# Patient Record
Sex: MALE | Race: WHITE | Employment: OTHER | URBAN - METROPOLITAN AREA
[De-identification: names, ages, dates, MRNs, and addresses within clinical notes are randomized per-mention and may not be internally consistent; named-entity substitution may affect disease eponyms.]

---

## 2018-01-05 ENCOUNTER — HOSPITAL ENCOUNTER (OUTPATIENT)
Dept: PHYSICAL THERAPY | Age: 68
Setting detail: THERAPIES SERIES
Discharge: HOME | End: 2018-01-05
Payer: COMMERCIAL

## 2018-01-05 PROCEDURE — 97110 THERAPEUTIC EXERCISES: CPT

## 2018-01-05 PROCEDURE — 97162 PT EVAL MOD COMPLEX 30 MIN: CPT

## 2018-01-05 PROCEDURE — G8978 MOBILITY CURRENT STATUS: HCPCS

## 2018-01-05 PROCEDURE — G8979 MOBILITY GOAL STATUS: HCPCS

## 2018-01-05 PROCEDURE — 97140 MANUAL THERAPY 1/> REGIONS: CPT
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
## 2018-01-08 ENCOUNTER — APPOINTMENT (OUTPATIENT)
Dept: PHYSICAL THERAPY | Age: 68
End: 2018-01-08
Payer: COMMERCIAL

## 2018-01-09 ENCOUNTER — HOSPITAL ENCOUNTER (OUTPATIENT)
Dept: PHYSICAL THERAPY | Age: 68
Setting detail: THERAPIES SERIES
Discharge: HOME | End: 2018-01-09
Payer: COMMERCIAL

## 2018-01-09 PROCEDURE — 97140 MANUAL THERAPY 1/> REGIONS: CPT

## 2018-01-09 PROCEDURE — G0283 ELEC STIM OTHER THAN WOUND: HCPCS

## 2018-01-09 PROCEDURE — 97110 THERAPEUTIC EXERCISES: CPT

## 2018-01-09 ASSESSMENT — PAIN SCALES - GENERAL: PAINLEVEL_OUTOF10: 4

## 2018-01-09 ASSESSMENT — PAIN DESCRIPTION - LOCATION: LOCATION: LEG;KNEE

## 2018-01-12 ENCOUNTER — HOSPITAL ENCOUNTER (OUTPATIENT)
Dept: PHYSICAL THERAPY | Age: 68
Setting detail: THERAPIES SERIES
Discharge: HOME | End: 2018-01-12
Payer: COMMERCIAL

## 2018-01-12 PROCEDURE — 97012 MECHANICAL TRACTION THERAPY: CPT

## 2018-01-12 PROCEDURE — 97530 THERAPEUTIC ACTIVITIES: CPT

## 2018-01-12 ASSESSMENT — PAIN DESCRIPTION - PAIN TYPE: TYPE: ACUTE PAIN

## 2018-01-12 ASSESSMENT — PAIN SCALES - GENERAL: PAINLEVEL_OUTOF10: 7

## 2018-01-12 ASSESSMENT — PAIN DESCRIPTION - LOCATION: LOCATION: LEG;KNEE

## 2018-01-12 NOTE — PROGRESS NOTES
increased R LE pain and was willing to try lumbar traction. Pt reports feeling \"better\" when he was in the traction unit but once he was up and walking his symptoms returned. Also discussed his home TENS unit and if pt not able to get any results with his unit we can use the one in our clinic. Pt reporting that he was able to feel the estim in our clinic better vs his home unit. 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 Also advised pt to use a st cane for the time being. REQUIRES PT FOLLOW UP: Yes      G-Code:     OutComes Score                                           Goals:  Short term goals  Time Frame for Short term goals: 1-2 weeks  Short term goal 1: Pt reports low back/R hip pain 3/10 or less with increased mobility  Long term goals  Time Frame for Long term goals : 4-5 weeks  Long term goal 1: Pt reports low back/Right hip pain 2/10 or less with increased mobility  Long term goal 2: Pt reports a  of NT down R LE by 75% or better  Long term goal 3: Increase R LE strength & core strength  to 4+/5 or greater so pt can do work duties painfree  Long term goal 4: Improve Oswestry to <20% or better  Long term goal 5:  Indep with HEP  Patient Goals   Patient goals : Be able to get rid of his back and hip pain    Plan:       Frequency and duration of tx  Days: 2 (2-3 - increased to 3x per week per pt request on 18)  Weeks:  (4-5)     Therapy Time   Individual Concurrent Group Co-treatment   Time In  11:00am         Time Out  12:00pm         Minutes  60 min                 Radha Ramos, LX#[id number]

## 2018-01-15 ENCOUNTER — HOSPITAL ENCOUNTER (OUTPATIENT)
Dept: PHYSICAL THERAPY | Age: 68
Setting detail: THERAPIES SERIES
Discharge: HOME | End: 2018-01-15
Payer: COMMERCIAL

## 2018-01-15 PROCEDURE — 97530 THERAPEUTIC ACTIVITIES: CPT

## 2018-01-15 PROCEDURE — 97012 MECHANICAL TRACTION THERAPY: CPT

## 2018-01-15 ASSESSMENT — PAIN SCALES - GENERAL: PAINLEVEL_OUTOF10: 4

## 2018-01-15 ASSESSMENT — PAIN DESCRIPTION - LOCATION: LOCATION: LEG;KNEE

## 2018-01-15 ASSESSMENT — PAIN DESCRIPTION - PAIN TYPE: TYPE: ACUTE PAIN

## 2018-01-15 NOTE — PROGRESS NOTES
Physical Therapy  Daily Treatment Note  Date: 1/15/2018  Patient Name: Nayeli Castanon  MRN: [id number]     :   1950    Subjective:   General  Chart Reviewed: Yes  Family / Caregiver Present: No  Referring Practitioner: Dr. Viv Zavala  PT Visit Information  Onset Date: 17  Total # of Visits Approved: 10 (2-3x per week for 4-5 weeks)  Total # of Visits to Date: 4  Plan of Care/Certification Expiration Date: 18  No Show: 0  Canceled Appointment: 0  Subjective  Subjective: Pt walking more upright this date using his cane and continues to have R LE pain but not as intense. Pt states he was laying in bed most of the day yesterday because he did not feel well. Pt reports getting a memory foam mattress. Pt cx'd appt initially secondary to not feeling well and then ended up feeling better so he thought he would come in and \"take a chance\". Pt states he is most painful when he is just standing. Pain Screening  Patient Currently in Pain: Yes  Pain Assessment  Pain Assessment: 0-10  Pain Level: 4  Pain Type: Acute pain  Pain Location: Leg;Knee  Vital Signs  Patient Currently in Pain: Yes       Treatment Activities:                  Ambulation  Ambulation?: Yes  Ambulation 1  Surface: level tile;carpet  Device: Single point cane  Assistance: Modified Independent  Quality of Gait: Pt ambulated with more upright trunk and less antalgia  Distance: In clinic - 50' x 2                         Modalities  Lumbar traction: Supine lumbar traction 65#on/10# off for 45 sec pull/15 sec release at 25 degree angle                               Assessment:   Conditions Requiring Skilled Therapeutic Intervention  Body structures, Functions, Activity limitations: Decreased functional mobility ; Decreased ADL status; Decreased strength;Decreased ROM  Assessment: Pt looking more upright this date when ambulating while using his cane and less painful. Performed lumbar traction for futher pain relief this date.  Attempted inc # this

## 2018-01-15 NOTE — PROGRESS NOTES
Physical Therapy  Daily Treatment Note  Date: 1/15/2018  Patient Name: Deven Rahman  MRN: [id number]     :   1950    Subjective:   General  Chart Reviewed: Yes  Family / Caregiver Present: No  Referring Practitioner: Dr. Petros Alexis  PT Visit Information  Onset Date: 17  Total # of Visits Approved: 10 (2-3x per week for 4-5 weeks)  Total # of Visits to Date: 3  Plan of Care/Certification Expiration Date: 18  No Show: 0  Canceled Appointment: 1  Subjective  Subjective: Pt cx'd appt. \"Not feeling well today\". Patient then arrived at 1100, feeling better and wishing to have his tx session back. Able to fit pt in for limited session to perform traction especially as giving patient relief. Assessment:   Conditions Requiring Skilled Therapeutic Intervention  Body structures, Functions, Activity limitations: Decreased functional mobility ; Decreased ADL status; Decreased strength;Decreased ROM  Treatment Diagnosis: Back pain with sciatica  REQUIRES PT FOLLOW UP: Yes      Goals:  Short term goals  Time Frame for Short term goals: 1-2 weeks  Short term goal 1: Pt reports low back/R hip pain 3/10 or less with increased mobility  Long term goals  Time Frame for Long term goals : 4-5 weeks  Long term goal 1: Pt reports low back/Right hip pain 2/10 or less with increased mobility  Long term goal 2: Pt reports a  of NT down R LE by 75% or better  Long term goal 3: Increase R LE strength & core strength  to 4+/5 or greater so pt can do work duties painfree  Long term goal 4: Improve Oswestry to <20% or better  Long term goal 5:  Indep with HEP  Patient Goals   Patient goals : Be able to get rid of his back and hip pain    Plan:      Plan   Plan  Times per week: 2x per week for 4-5 weeks=10 visits  Current Treatment Recommendations: Strengthening, ROM, Balance Training, Functional Mobility Training, Gait Training, Stair training, Neuromuscular Re-education, Manual Therapy - Soft Tissue Mobilization, Manual Therapy - Joint Manipulation, Pain Management, Home Exercise Program, Patient/Caregiver Education & Training  Plan Comment: Estim, US, HP/CP, KT tape, lumbar traction  Frequency and duration of tx  Days: 2 (2-3)  Weeks:  (4-5)     Therapy Time   Individual Concurrent Group Co-treatment   Time In           Time Out           Minutes  0          PT cancelled due to illness, then feeling better and came to therapy, able to give patient shortened session, will write second note for actual treatment.        Michael Bhandari PTA  License and [address] Number: [id number]

## 2018-01-17 ENCOUNTER — HOSPITAL ENCOUNTER (OUTPATIENT)
Dept: PHYSICAL THERAPY | Age: 68
Setting detail: THERAPIES SERIES
Discharge: HOME | End: 2018-01-17
Payer: COMMERCIAL

## 2018-01-17 PROCEDURE — 97140 MANUAL THERAPY 1/> REGIONS: CPT

## 2018-01-17 PROCEDURE — 97012 MECHANICAL TRACTION THERAPY: CPT

## 2018-01-17 PROCEDURE — 97110 THERAPEUTIC EXERCISES: CPT

## 2018-01-17 ASSESSMENT — PAIN DESCRIPTION - PAIN TYPE: TYPE: ACUTE PAIN

## 2018-01-17 ASSESSMENT — PAIN DESCRIPTION - DESCRIPTORS: DESCRIPTORS: ACHING;THROBBING

## 2018-01-17 ASSESSMENT — PAIN DESCRIPTION - ORIENTATION: ORIENTATION: RIGHT

## 2018-01-17 ASSESSMENT — PAIN SCALES - GENERAL: PAINLEVEL_OUTOF10: 4

## 2018-01-17 ASSESSMENT — PAIN DESCRIPTION - LOCATION: LOCATION: LEG;KNEE

## 2018-01-19 ENCOUNTER — HOSPITAL ENCOUNTER (OUTPATIENT)
Dept: PHYSICAL THERAPY | Age: 68
Setting detail: THERAPIES SERIES
Discharge: HOME | End: 2018-01-19
Payer: COMMERCIAL

## 2018-01-19 PROCEDURE — 97140 MANUAL THERAPY 1/> REGIONS: CPT

## 2018-01-19 PROCEDURE — 97110 THERAPEUTIC EXERCISES: CPT

## 2018-01-19 PROCEDURE — 97012 MECHANICAL TRACTION THERAPY: CPT

## 2018-01-19 NOTE — PROGRESS NOTES
Physical Therapy  Daily Treatment Note  Date: 2018  Patient Name: Nina Iisdro  MRN: [id number]     :   1950    Subjective:   General  Chart Reviewed: Yes  Family / Caregiver Present: No  Referring Practitioner: Dr. Uma Cleveland  PT Visit Information  Onset Date: 17  Total # of Visits Approved: 10 (2-3x per week for 4-5 weeks)  Total # of Visits to Date: 6  Plan of Care/Certification Expiration Date: 18  No Show: 0  Canceled Appointment: 0  Subjective  Subjective: Pt reports no R LE currently but having recurrent R LE 5/10 pain \"Im getting sick of this\" in regards to R LE pain  Pain Screening  Patient Currently in Pain: No  Vital Signs  Patient Currently in Pain: No       Treatment Activities:   Manual therapy  Joint mobilization: Long leg distraction & oscillations R LE x 5 reps hold 20 seconds - pt reports that it felt good for his right hip  PROM: PROM to R hip for hip flex and ER/IR        Exercises  Exercise 1: SKTC x 5 H20 -Causing more discomfort in R knee today. Exercise 3: butterfly stretch - attempted but increased pain in R LE     Modalities  Lumbar traction: Supine Lumbar traction 70#pull/0# rest; pull for 45 sec and rest for 15 rest at 15% angle  Manual therapy  Joint mobilization: Long leg distraction & oscillations R LE x 5 reps hold 20 seconds - pt reports that it felt good for his right hip  PROM: PROM to R hip for hip flex and ER/IR                          Assessment:   Conditions Requiring Skilled Therapeutic Intervention  Body structures, Functions, Activity limitations: Decreased functional mobility ; Decreased ADL status; Decreased strength;Decreased ROM  Assessment: Pt reports feeling good when he is receiving traction and when he first is done with traction but that it doesnt last. Pt reports also feeling like he has to have a bowel movement a lot ever since he took  Evermind for his nerve pain. Pt has stopped taking for about 4-5 days & still having this issue.  PT advised pt to call his doctor to see if he can get any relief. Pt reports not having long lasting effects with PT. Treatment Diagnosis: Back pain with sciatica  REQUIRES PT FOLLOW UP: Yes                Goals:  Short term goals  Time Frame for Short term goals: 1-2 weeks  Short term goal 1: Pt reports low back/R hip pain 3/10 or less with increased mobility  Long term goals  Time Frame for Long term goals : 4-5 weeks  Long term goal 1: Pt reports low back/Right hip pain 2/10 or less with increased mobility  Long term goal 2: Pt reports a  of NT down R LE by 75% or better  Long term goal 3: Increase R LE strength & core strength  to 4+/5 or greater so pt can do work duties painfree  Long term goal 4: Improve Oswestry to <20% or better  Long term goal 5:  Indep with HEP  Patient Goals   Patient goals : Be able to get rid of his back and hip pain    Plan:       Frequency and duration of tx  Days: 2 (2-3)  Weeks:  (4-5)     Therapy Time   Individual Concurrent Group Co-treatment   Time In  12:00pm         Time Out  1:00pm         Minutes  60 min                 SERA Ryder#[id number]

## 2018-01-22 ENCOUNTER — HOSPITAL ENCOUNTER (OUTPATIENT)
Dept: PHYSICAL THERAPY | Age: 68
Setting detail: THERAPIES SERIES
Discharge: HOME | End: 2018-01-22
Payer: COMMERCIAL

## 2018-01-22 PROCEDURE — 97012 MECHANICAL TRACTION THERAPY: CPT

## 2018-01-22 PROCEDURE — 97140 MANUAL THERAPY 1/> REGIONS: CPT

## 2018-01-22 PROCEDURE — 97530 THERAPEUTIC ACTIVITIES: CPT

## 2018-01-22 ASSESSMENT — PAIN DESCRIPTION - LOCATION: LOCATION: KNEE

## 2018-01-22 ASSESSMENT — PAIN DESCRIPTION - DESCRIPTORS: DESCRIPTORS: ACHING

## 2018-01-22 ASSESSMENT — PAIN DESCRIPTION - PAIN TYPE: TYPE: ACUTE PAIN

## 2018-01-22 ASSESSMENT — PAIN SCALES - GENERAL: PAINLEVEL_OUTOF10: 4

## 2018-01-22 ASSESSMENT — PAIN DESCRIPTION - ORIENTATION: ORIENTATION: RIGHT

## 2018-01-24 ENCOUNTER — HOSPITAL ENCOUNTER (OUTPATIENT)
Dept: PHYSICAL THERAPY | Age: 68
Setting detail: THERAPIES SERIES
Discharge: HOME | End: 2018-01-24
Payer: COMMERCIAL

## 2018-01-26 ENCOUNTER — HOSPITAL ENCOUNTER (OUTPATIENT)
Dept: PHYSICAL THERAPY | Age: 68
Setting detail: THERAPIES SERIES
Discharge: HOME | End: 2018-01-26
Payer: COMMERCIAL

## 2018-01-26 NOTE — PROGRESS NOTES
Physical Therapy  Daily Treatment Note  Date: 2018  Patient Name: Lavonna Shone  MRN: [id number]     :   1950    Subjective:   General  Chart Reviewed: Yes  Family / Caregiver Present: No  Referring Practitioner: Dr. Dennis Morrow  PT Visit Information  Onset Date: 17  Total # of Visits Approved: 10  Total # of Visits to Date: 7  Plan of Care/Certification Expiration Date: 18  No Show: 1  Canceled Appointment: 1  Subjective  Subjective: Pt failed to show for therapy. Treatment Activities:                                                                          Assessment:   Conditions Requiring Skilled Therapeutic Intervention  Body structures, Functions, Activity limitations: Decreased functional mobility ; Decreased ADL status; Decreased strength;Decreased ROM  Treatment Diagnosis: Back pain with sciatica  REQUIRES PT FOLLOW UP: Yes      G-Code:     OutComes Score                                           Goals:  Short term goals  Time Frame for Short term goals: 1-2 weeks  Short term goal 1: Pt reports low back/R hip pain 3/10 or less with increased mobility  Long term goals  Time Frame for Long term goals : 4-5 weeks  Long term goal 1: Pt reports low back/Right hip pain 2/10 or less with increased mobility  Long term goal 2: Pt reports a  of NT down R LE by 75% or better  Long term goal 3: Increase R LE strength & core strength  to 4+/5 or greater so pt can do work duties painfree  Long term goal 4: Improve Oswestry to <20% or better  Long term goal 5:  Indep with HEP  Patient Goals   Patient goals : Be able to get rid of his back and hip pain    Plan:       Frequency and duration of tx  Days: 2 (2-3)  Weeks: 4 (4-5)     Therapy Time   Individual Concurrent Group Co-treatment   Time In  1000         Time Out  1010         Minutes  5 min   No show                 Margaret Zamudio PTA  License and [address] Number: [id number]

## 2018-01-30 ENCOUNTER — HOSPITAL ENCOUNTER (OUTPATIENT)
Dept: MRI IMAGING | Age: 68
Discharge: HOME | End: 2018-02-01
Payer: COMMERCIAL

## 2018-01-30 DIAGNOSIS — M54.16 LUMBAR RADICULOPATHY: ICD-10-CM

## 2018-01-30 DIAGNOSIS — M47.816 LUMBAR SPONDYLOSIS: ICD-10-CM

## 2018-01-30 PROCEDURE — 72148 MRI LUMBAR SPINE W/O DYE: CPT

## 2018-02-06 PROBLEM — M48.061 SPINAL STENOSIS OF LUMBAR REGION WITHOUT NEUROGENIC CLAUDICATION: Status: ACTIVE | Noted: 2018-02-06

## 2018-02-06 PROBLEM — M51.26 HERNIATED LUMBAR INTERVERTEBRAL DISC: Status: ACTIVE | Noted: 2018-02-06

## 2018-02-06 PROBLEM — M54.16 LUMBAR RADICULOPATHY: Status: ACTIVE | Noted: 2018-02-06

## 2018-02-09 ENCOUNTER — HOSPITAL ENCOUNTER (OUTPATIENT)
Dept: PREADMISSION TESTING | Age: 68
Discharge: HOME | End: 2018-02-13
Payer: COMMERCIAL

## 2018-02-09 VITALS
WEIGHT: 195 LBS | SYSTOLIC BLOOD PRESSURE: 104 MMHG | DIASTOLIC BLOOD PRESSURE: 72 MMHG | TEMPERATURE: 99 F | OXYGEN SATURATION: 94 % | RESPIRATION RATE: 16 BRPM | HEIGHT: 71 IN | BODY MASS INDEX: 27.3 KG/M2 | HEART RATE: 73 BPM

## 2018-02-09 DIAGNOSIS — A04.72 C. DIFFICILE DIARRHEA: Chronic | ICD-10-CM

## 2018-02-09 DIAGNOSIS — N36.42 INTRINSIC URETHRAL SPHINCTER DEFICIENCY: ICD-10-CM

## 2018-02-09 PROBLEM — E78.5 HYPERLIPIDEMIA: Chronic | Status: ACTIVE | Noted: 2018-02-09

## 2018-02-09 PROBLEM — I10 HTN (HYPERTENSION): Chronic | Status: ACTIVE | Noted: 2018-02-09

## 2018-02-09 PROBLEM — G47.00 INSOMNIA: Chronic | Status: ACTIVE | Noted: 2018-02-09

## 2018-02-09 LAB
ANION GAP SERPL CALCULATED.3IONS-SCNC: 11 MEQ/L (ref 7–13)
BILIRUBIN URINE: NEGATIVE
BLOOD, URINE: NEGATIVE
BUN BLDV-MCNC: 21 MG/DL (ref 8–23)
CALCIUM SERPL-MCNC: 9.3 MG/DL (ref 8.6–10.2)
CHLORIDE BLD-SCNC: 102 MEQ/L (ref 98–107)
CLARITY: CLEAR
CO2: 26 MEQ/L (ref 22–29)
COLOR: YELLOW
CREAT SERPL-MCNC: 1.08 MG/DL (ref 0.7–1.2)
GFR AFRICAN AMERICAN: >60
GFR NON-AFRICAN AMERICAN: >60
GLUCOSE BLD-MCNC: 79 MG/DL (ref 74–109)
GLUCOSE URINE: NEGATIVE MG/DL
HCT VFR BLD CALC: 44.8 % (ref 42–52)
HEMOGLOBIN: 14.9 G/DL (ref 14–18)
INR BLD: 1
KETONES, URINE: NEGATIVE MG/DL
LEUKOCYTE ESTERASE, URINE: NEGATIVE
MCH RBC QN AUTO: 30.6 PG (ref 27–31.3)
MCHC RBC AUTO-ENTMCNC: 33.3 % (ref 33–37)
MCV RBC AUTO: 91.8 FL (ref 80–100)
NITRITE, URINE: NEGATIVE
PDW BLD-RTO: 14.7 % (ref 11.5–14.5)
PH UA: 5.5 (ref 5–9)
PLATELET # BLD: 175 K/UL (ref 130–400)
POTASSIUM SERPL-SCNC: 5.1 MEQ/L (ref 3.5–5.1)
PROTEIN UA: NEGATIVE MG/DL
PROTHROMBIN TIME: 10.7 SEC (ref 8.1–13.7)
RBC # BLD: 4.88 M/UL (ref 4.7–6.1)
SODIUM BLD-SCNC: 139 MEQ/L (ref 132–144)
SPECIFIC GRAVITY UA: 1.02 (ref 1–1.03)
URINE REFLEX TO CULTURE: NORMAL
UROBILINOGEN, URINE: 0.2 E.U./DL
WBC # BLD: 6.2 K/UL (ref 4.8–10.8)

## 2018-02-09 PROCEDURE — 80048 BASIC METABOLIC PNL TOTAL CA: CPT

## 2018-02-09 PROCEDURE — 85027 COMPLETE CBC AUTOMATED: CPT

## 2018-02-09 PROCEDURE — 86870 RBC ANTIBODY IDENTIFICATION: CPT

## 2018-02-09 PROCEDURE — 86850 RBC ANTIBODY SCREEN: CPT

## 2018-02-09 PROCEDURE — 86900 BLOOD TYPING SEROLOGIC ABO: CPT

## 2018-02-09 PROCEDURE — 85610 PROTHROMBIN TIME: CPT

## 2018-02-09 PROCEDURE — 86901 BLOOD TYPING SEROLOGIC RH(D): CPT

## 2018-02-09 PROCEDURE — 81003 URINALYSIS AUTO W/O SCOPE: CPT

## 2018-02-09 RX ORDER — ACETAMINOPHEN 325 MG/1
650 TABLET ORAL EVERY 6 HOURS PRN
COMMUNITY

## 2018-02-09 RX ORDER — SODIUM CHLORIDE 0.9 % (FLUSH) 0.9 %
10 SYRINGE (ML) INJECTION PRN
Status: CANCELLED | OUTPATIENT
Start: 2018-02-09

## 2018-02-09 RX ORDER — LIDOCAINE HYDROCHLORIDE 10 MG/ML
1 INJECTION, SOLUTION EPIDURAL; INFILTRATION; INTRACAUDAL; PERINEURAL
Status: CANCELLED | OUTPATIENT
Start: 2018-02-09 | End: 2018-02-09

## 2018-02-09 RX ORDER — SODIUM CHLORIDE, SODIUM LACTATE, POTASSIUM CHLORIDE, CALCIUM CHLORIDE 600; 310; 30; 20 MG/100ML; MG/100ML; MG/100ML; MG/100ML
INJECTION, SOLUTION INTRAVENOUS CONTINUOUS
Status: CANCELLED | OUTPATIENT
Start: 2018-02-09

## 2018-02-09 RX ORDER — SODIUM CHLORIDE, SODIUM LACTATE, POTASSIUM CHLORIDE, CALCIUM CHLORIDE 600; 310; 30; 20 MG/100ML; MG/100ML; MG/100ML; MG/100ML
INJECTION, SOLUTION INTRAVENOUS CONTINUOUS
Status: CANCELLED | OUTPATIENT
Start: 2018-02-14

## 2018-02-09 RX ORDER — SODIUM CHLORIDE 0.9 % (FLUSH) 0.9 %
10 SYRINGE (ML) INJECTION EVERY 12 HOURS SCHEDULED
Status: CANCELLED | OUTPATIENT
Start: 2018-02-09

## 2018-02-10 LAB
ABO/RH: NORMAL
ANTIBODY IDENTIFICATION: NORMAL
ANTIBODY SCREEN: NORMAL

## 2018-02-14 ENCOUNTER — HOSPITAL ENCOUNTER (OUTPATIENT)
Age: 68
Discharge: HOME | End: 2018-02-14
Attending: NEUROLOGICAL SURGERY | Admitting: NEUROLOGICAL SURGERY
Payer: COMMERCIAL

## 2018-02-14 ENCOUNTER — HOSPITAL ENCOUNTER (OUTPATIENT)
Dept: GENERAL RADIOLOGY | Age: 68
Setting detail: OUTPATIENT SURGERY
Discharge: HOME | End: 2018-02-16
Attending: NEUROLOGICAL SURGERY
Payer: COMMERCIAL

## 2018-02-14 ENCOUNTER — ANESTHESIA (OUTPATIENT)
Dept: OPERATING ROOM | Age: 68
End: 2018-02-14
Payer: COMMERCIAL

## 2018-02-14 ENCOUNTER — ANESTHESIA EVENT (OUTPATIENT)
Dept: OPERATING ROOM | Age: 68
End: 2018-02-14
Payer: COMMERCIAL

## 2018-02-14 VITALS — OXYGEN SATURATION: 100 % | TEMPERATURE: 95.4 F | DIASTOLIC BLOOD PRESSURE: 101 MMHG | SYSTOLIC BLOOD PRESSURE: 139 MMHG

## 2018-02-14 VITALS
RESPIRATION RATE: 20 BRPM | OXYGEN SATURATION: 95 % | DIASTOLIC BLOOD PRESSURE: 71 MMHG | HEART RATE: 104 BPM | WEIGHT: 195 LBS | SYSTOLIC BLOOD PRESSURE: 106 MMHG | HEIGHT: 71 IN | TEMPERATURE: 98.1 F | BODY MASS INDEX: 27.3 KG/M2

## 2018-02-14 DIAGNOSIS — M48.061 SPINAL STENOSIS OF LUMBAR REGION WITHOUT NEUROGENIC CLAUDICATION: ICD-10-CM

## 2018-02-14 DIAGNOSIS — M54.16 LUMBAR RADICULOPATHY: ICD-10-CM

## 2018-02-14 DIAGNOSIS — R52 PAIN: ICD-10-CM

## 2018-02-14 DIAGNOSIS — M51.26 HERNIATED LUMBAR INTERVERTEBRAL DISC: Primary | ICD-10-CM

## 2018-02-14 LAB
EKG ATRIAL RATE: 103 BPM
EKG P AXIS: 35 DEGREES
EKG P-R INTERVAL: 148 MS
EKG Q-T INTERVAL: 382 MS
EKG QRS DURATION: 126 MS
EKG QTC CALCULATION (BAZETT): 500 MS
EKG R AXIS: -60 DEGREES
EKG T AXIS: 94 DEGREES
EKG VENTRICULAR RATE: 103 BPM

## 2018-02-14 PROCEDURE — 3600000014 HC SURGERY LEVEL 4 ADDTL 15MIN: Performed by: NEUROLOGICAL SURGERY

## 2018-02-14 PROCEDURE — 2580000003 HC RX 258: Performed by: STUDENT IN AN ORGANIZED HEALTH CARE EDUCATION/TRAINING PROGRAM

## 2018-02-14 PROCEDURE — 6370000000 HC RX 637 (ALT 250 FOR IP): Performed by: NEUROLOGICAL SURGERY

## 2018-02-14 PROCEDURE — 2500000003 HC RX 250 WO HCPCS: Performed by: NURSE ANESTHETIST, CERTIFIED REGISTERED

## 2018-02-14 PROCEDURE — 3700000001 HC ADD 15 MINUTES (ANESTHESIA): Performed by: NEUROLOGICAL SURGERY

## 2018-02-14 PROCEDURE — 6360000002 HC RX W HCPCS: Performed by: NURSE ANESTHETIST, CERTIFIED REGISTERED

## 2018-02-14 PROCEDURE — 2720000010 HC SURG SUPPLY STERILE: Performed by: NEUROLOGICAL SURGERY

## 2018-02-14 PROCEDURE — 7100000001 HC PACU RECOVERY - ADDTL 15 MIN: Performed by: NEUROLOGICAL SURGERY

## 2018-02-14 PROCEDURE — 3600000004 HC SURGERY LEVEL 4 BASE: Performed by: NEUROLOGICAL SURGERY

## 2018-02-14 PROCEDURE — 2500000003 HC RX 250 WO HCPCS: Performed by: STUDENT IN AN ORGANIZED HEALTH CARE EDUCATION/TRAINING PROGRAM

## 2018-02-14 PROCEDURE — 3700000000 HC ANESTHESIA ATTENDED CARE: Performed by: NEUROLOGICAL SURGERY

## 2018-02-14 PROCEDURE — A6402 STERILE GAUZE <= 16 SQ IN: HCPCS | Performed by: NEUROLOGICAL SURGERY

## 2018-02-14 PROCEDURE — 6360000002 HC RX W HCPCS: Performed by: NURSE PRACTITIONER

## 2018-02-14 PROCEDURE — 72020 X-RAY EXAM OF SPINE 1 VIEW: CPT

## 2018-02-14 PROCEDURE — 2500000003 HC RX 250 WO HCPCS: Performed by: NEUROLOGICAL SURGERY

## 2018-02-14 PROCEDURE — 2580000003 HC RX 258: Performed by: NEUROLOGICAL SURGERY

## 2018-02-14 PROCEDURE — 93005 ELECTROCARDIOGRAM TRACING: CPT

## 2018-02-14 PROCEDURE — 7100000000 HC PACU RECOVERY - FIRST 15 MIN: Performed by: NEUROLOGICAL SURGERY

## 2018-02-14 PROCEDURE — 6360000002 HC RX W HCPCS: Performed by: NEUROLOGICAL SURGERY

## 2018-02-14 RX ORDER — ONDANSETRON 2 MG/ML
4 INJECTION INTRAMUSCULAR; INTRAVENOUS EVERY 6 HOURS PRN
Status: DISCONTINUED | OUTPATIENT
Start: 2018-02-14 | End: 2018-02-14 | Stop reason: HOSPADM

## 2018-02-14 RX ORDER — MORPHINE SULFATE 2 MG/ML
2 INJECTION, SOLUTION INTRAMUSCULAR; INTRAVENOUS
Status: DISCONTINUED | OUTPATIENT
Start: 2018-02-14 | End: 2018-02-14 | Stop reason: HOSPADM

## 2018-02-14 RX ORDER — ACETAMINOPHEN 325 MG/1
650 TABLET ORAL EVERY 4 HOURS PRN
Status: DISCONTINUED | OUTPATIENT
Start: 2018-02-14 | End: 2018-02-14 | Stop reason: HOSPADM

## 2018-02-14 RX ORDER — RAMIPRIL 5 MG/1
10 CAPSULE ORAL DAILY
Status: DISCONTINUED | OUTPATIENT
Start: 2018-02-14 | End: 2018-02-14 | Stop reason: HOSPADM

## 2018-02-14 RX ORDER — CYCLOBENZAPRINE HCL 10 MG
10 TABLET ORAL 3 TIMES DAILY PRN
Status: DISCONTINUED | OUTPATIENT
Start: 2018-02-14 | End: 2018-02-14 | Stop reason: HOSPADM

## 2018-02-14 RX ORDER — DEXAMETHASONE SODIUM PHOSPHATE 4 MG/ML
INJECTION, SOLUTION INTRA-ARTICULAR; INTRALESIONAL; INTRAMUSCULAR; INTRAVENOUS; SOFT TISSUE PRN
Status: DISCONTINUED | OUTPATIENT
Start: 2018-02-14 | End: 2018-02-14 | Stop reason: SDUPTHER

## 2018-02-14 RX ORDER — FENTANYL 25 UG/H
1 PATCH TRANSDERMAL
Status: DISCONTINUED | OUTPATIENT
Start: 2018-02-14 | End: 2018-02-14 | Stop reason: HOSPADM

## 2018-02-14 RX ORDER — SODIUM CHLORIDE 0.9 % (FLUSH) 0.9 %
10 SYRINGE (ML) INJECTION PRN
Status: DISCONTINUED | OUTPATIENT
Start: 2018-02-14 | End: 2018-02-14 | Stop reason: HOSPADM

## 2018-02-14 RX ORDER — GLYCOPYRROLATE 0.2 MG/ML
INJECTION INTRAMUSCULAR; INTRAVENOUS PRN
Status: DISCONTINUED | OUTPATIENT
Start: 2018-02-14 | End: 2018-02-14 | Stop reason: SDUPTHER

## 2018-02-14 RX ORDER — HYDROCODONE BITARTRATE AND ACETAMINOPHEN 5; 325 MG/1; MG/1
2 TABLET ORAL PRN
Status: DISCONTINUED | OUTPATIENT
Start: 2018-02-14 | End: 2018-02-14 | Stop reason: HOSPADM

## 2018-02-14 RX ORDER — ASPIRIN 81 MG/1
81 TABLET ORAL DAILY
Status: DISCONTINUED | OUTPATIENT
Start: 2018-02-14 | End: 2018-02-14 | Stop reason: HOSPADM

## 2018-02-14 RX ORDER — HYDROCODONE BITARTRATE AND ACETAMINOPHEN 5; 325 MG/1; MG/1
1 TABLET ORAL PRN
Status: DISCONTINUED | OUTPATIENT
Start: 2018-02-14 | End: 2018-02-14 | Stop reason: HOSPADM

## 2018-02-14 RX ORDER — EPHEDRINE SULFATE 50 MG/ML
INJECTION, SOLUTION INTRAVENOUS PRN
Status: DISCONTINUED | OUTPATIENT
Start: 2018-02-14 | End: 2018-02-14 | Stop reason: SDUPTHER

## 2018-02-14 RX ORDER — DIPHENHYDRAMINE HYDROCHLORIDE 50 MG/ML
12.5 INJECTION INTRAMUSCULAR; INTRAVENOUS
Status: DISCONTINUED | OUTPATIENT
Start: 2018-02-14 | End: 2018-02-14 | Stop reason: HOSPADM

## 2018-02-14 RX ORDER — SODIUM CHLORIDE, SODIUM LACTATE, POTASSIUM CHLORIDE, CALCIUM CHLORIDE 600; 310; 30; 20 MG/100ML; MG/100ML; MG/100ML; MG/100ML
INJECTION, SOLUTION INTRAVENOUS CONTINUOUS
Status: DISCONTINUED | OUTPATIENT
Start: 2018-02-14 | End: 2018-02-14

## 2018-02-14 RX ORDER — FENTANYL CITRATE 50 UG/ML
50 INJECTION, SOLUTION INTRAMUSCULAR; INTRAVENOUS EVERY 10 MIN PRN
Status: DISCONTINUED | OUTPATIENT
Start: 2018-02-14 | End: 2018-02-14 | Stop reason: HOSPADM

## 2018-02-14 RX ORDER — SIMVASTATIN 5 MG
5 TABLET ORAL NIGHTLY
Status: CANCELLED | OUTPATIENT
Start: 2018-02-14

## 2018-02-14 RX ORDER — MAGNESIUM HYDROXIDE 1200 MG/15ML
LIQUID ORAL CONTINUOUS PRN
Status: DISCONTINUED | OUTPATIENT
Start: 2018-02-14 | End: 2018-02-14 | Stop reason: HOSPADM

## 2018-02-14 RX ORDER — CLONAZEPAM 2 MG/1
2 TABLET ORAL 2 TIMES DAILY
Status: DISCONTINUED | OUTPATIENT
Start: 2018-02-14 | End: 2018-02-14 | Stop reason: HOSPADM

## 2018-02-14 RX ORDER — HYDROCODONE BITARTRATE AND ACETAMINOPHEN 5; 325 MG/1; MG/1
1 TABLET ORAL EVERY 6 HOURS PRN
Qty: 28 TABLET | Refills: 0 | Status: SHIPPED | OUTPATIENT
Start: 2018-02-14 | End: 2018-02-21

## 2018-02-14 RX ORDER — FENTANYL CITRATE 50 UG/ML
INJECTION, SOLUTION INTRAMUSCULAR; INTRAVENOUS PRN
Status: DISCONTINUED | OUTPATIENT
Start: 2018-02-14 | End: 2018-02-14 | Stop reason: SDUPTHER

## 2018-02-14 RX ORDER — LIDOCAINE HYDROCHLORIDE 10 MG/ML
1 INJECTION, SOLUTION EPIDURAL; INFILTRATION; INTRACAUDAL; PERINEURAL
Status: COMPLETED | OUTPATIENT
Start: 2018-02-14 | End: 2018-02-14

## 2018-02-14 RX ORDER — MIDAZOLAM HYDROCHLORIDE 1 MG/ML
INJECTION INTRAMUSCULAR; INTRAVENOUS PRN
Status: DISCONTINUED | OUTPATIENT
Start: 2018-02-14 | End: 2018-02-14 | Stop reason: SDUPTHER

## 2018-02-14 RX ORDER — GABAPENTIN 300 MG/1
600 CAPSULE ORAL 3 TIMES DAILY PRN
Status: DISCONTINUED | OUTPATIENT
Start: 2018-02-14 | End: 2018-02-14 | Stop reason: HOSPADM

## 2018-02-14 RX ORDER — MEPERIDINE HYDROCHLORIDE 25 MG/ML
12.5 INJECTION INTRAMUSCULAR; INTRAVENOUS; SUBCUTANEOUS EVERY 5 MIN PRN
Status: DISCONTINUED | OUTPATIENT
Start: 2018-02-14 | End: 2018-02-14 | Stop reason: HOSPADM

## 2018-02-14 RX ORDER — SODIUM CHLORIDE 450 MG/100ML
INJECTION, SOLUTION INTRAVENOUS CONTINUOUS
Status: DISCONTINUED | OUTPATIENT
Start: 2018-02-14 | End: 2018-02-14 | Stop reason: HOSPADM

## 2018-02-14 RX ORDER — ACETAMINOPHEN 325 MG/1
650 TABLET ORAL EVERY 6 HOURS PRN
Status: DISCONTINUED | OUTPATIENT
Start: 2018-02-14 | End: 2018-02-14 | Stop reason: HOSPADM

## 2018-02-14 RX ORDER — HYDROCODONE BITARTRATE AND ACETAMINOPHEN 5; 325 MG/1; MG/1
1 TABLET ORAL EVERY 4 HOURS PRN
Status: DISCONTINUED | OUTPATIENT
Start: 2018-02-14 | End: 2018-02-14 | Stop reason: HOSPADM

## 2018-02-14 RX ORDER — ESMOLOL HYDROCHLORIDE 10 MG/ML
INJECTION INTRAVENOUS PRN
Status: DISCONTINUED | OUTPATIENT
Start: 2018-02-14 | End: 2018-02-14 | Stop reason: SDUPTHER

## 2018-02-14 RX ORDER — MAGNESIUM HYDROXIDE/ALUMINUM HYDROXICE/SIMETHICONE 120; 1200; 1200 MG/30ML; MG/30ML; MG/30ML
30 SUSPENSION ORAL EVERY 6 HOURS PRN
Status: DISCONTINUED | OUTPATIENT
Start: 2018-02-14 | End: 2018-02-14 | Stop reason: HOSPADM

## 2018-02-14 RX ORDER — HYDROCODONE BITARTRATE AND ACETAMINOPHEN 5; 325 MG/1; MG/1
2 TABLET ORAL EVERY 4 HOURS PRN
Status: DISCONTINUED | OUTPATIENT
Start: 2018-02-14 | End: 2018-02-14 | Stop reason: HOSPADM

## 2018-02-14 RX ORDER — DOCUSATE SODIUM 100 MG/1
100 CAPSULE, LIQUID FILLED ORAL 2 TIMES DAILY
Status: DISCONTINUED | OUTPATIENT
Start: 2018-02-14 | End: 2018-02-14 | Stop reason: HOSPADM

## 2018-02-14 RX ORDER — ONDANSETRON 2 MG/ML
INJECTION INTRAMUSCULAR; INTRAVENOUS PRN
Status: DISCONTINUED | OUTPATIENT
Start: 2018-02-14 | End: 2018-02-14 | Stop reason: SDUPTHER

## 2018-02-14 RX ORDER — ROCURONIUM BROMIDE 10 MG/ML
INJECTION, SOLUTION INTRAVENOUS PRN
Status: DISCONTINUED | OUTPATIENT
Start: 2018-02-14 | End: 2018-02-14 | Stop reason: SDUPTHER

## 2018-02-14 RX ORDER — LIDOCAINE HYDROCHLORIDE 20 MG/ML
INJECTION, SOLUTION INFILTRATION; PERINEURAL PRN
Status: DISCONTINUED | OUTPATIENT
Start: 2018-02-14 | End: 2018-02-14 | Stop reason: SDUPTHER

## 2018-02-14 RX ORDER — ACETAMINOPHEN 650 MG/1
650 SUPPOSITORY RECTAL EVERY 4 HOURS PRN
Status: DISCONTINUED | OUTPATIENT
Start: 2018-02-14 | End: 2018-02-14 | Stop reason: DRUGHIGH

## 2018-02-14 RX ORDER — SODIUM CHLORIDE 0.9 % (FLUSH) 0.9 %
10 SYRINGE (ML) INJECTION EVERY 12 HOURS SCHEDULED
Status: DISCONTINUED | OUTPATIENT
Start: 2018-02-14 | End: 2018-02-14 | Stop reason: HOSPADM

## 2018-02-14 RX ORDER — HEPARIN SODIUM 5000 [USP'U]/ML
5000 INJECTION, SOLUTION INTRAVENOUS; SUBCUTANEOUS EVERY 8 HOURS SCHEDULED
Status: DISCONTINUED | OUTPATIENT
Start: 2018-02-14 | End: 2018-02-14 | Stop reason: HOSPADM

## 2018-02-14 RX ORDER — METOCLOPRAMIDE HYDROCHLORIDE 5 MG/ML
10 INJECTION INTRAMUSCULAR; INTRAVENOUS
Status: DISCONTINUED | OUTPATIENT
Start: 2018-02-14 | End: 2018-02-14 | Stop reason: HOSPADM

## 2018-02-14 RX ORDER — KETOROLAC TROMETHAMINE 30 MG/ML
30 INJECTION, SOLUTION INTRAMUSCULAR; INTRAVENOUS EVERY 6 HOURS
Status: DISCONTINUED | OUTPATIENT
Start: 2018-02-14 | End: 2018-02-14 | Stop reason: HOSPADM

## 2018-02-14 RX ORDER — PROPOFOL 10 MG/ML
INJECTION, EMULSION INTRAVENOUS PRN
Status: DISCONTINUED | OUTPATIENT
Start: 2018-02-14 | End: 2018-02-14 | Stop reason: SDUPTHER

## 2018-02-14 RX ORDER — NIACIN 500 MG/1
1000 TABLET, EXTENDED RELEASE ORAL NIGHTLY
Status: DISCONTINUED | OUTPATIENT
Start: 2018-02-14 | End: 2018-02-14 | Stop reason: HOSPADM

## 2018-02-14 RX ORDER — CARVEDILOL 3.12 MG/1
3.12 TABLET ORAL 2 TIMES DAILY
Status: DISCONTINUED | OUTPATIENT
Start: 2018-02-14 | End: 2018-02-14 | Stop reason: HOSPADM

## 2018-02-14 RX ORDER — LIDOCAINE HYDROCHLORIDE AND EPINEPHRINE 10; 10 MG/ML; UG/ML
INJECTION, SOLUTION INFILTRATION; PERINEURAL PRN
Status: DISCONTINUED | OUTPATIENT
Start: 2018-02-14 | End: 2018-02-14 | Stop reason: HOSPADM

## 2018-02-14 RX ORDER — ONDANSETRON 2 MG/ML
4 INJECTION INTRAMUSCULAR; INTRAVENOUS
Status: DISCONTINUED | OUTPATIENT
Start: 2018-02-14 | End: 2018-02-14 | Stop reason: HOSPADM

## 2018-02-14 RX ADMIN — SODIUM CHLORIDE, POTASSIUM CHLORIDE, SODIUM LACTATE AND CALCIUM CHLORIDE: 600; 310; 30; 20 INJECTION, SOLUTION INTRAVENOUS at 09:32

## 2018-02-14 RX ADMIN — LIDOCAINE HYDROCHLORIDE 0.1 ML: 10 INJECTION, SOLUTION EPIDURAL; INFILTRATION; INTRACAUDAL; PERINEURAL at 09:30

## 2018-02-14 RX ADMIN — ROCURONIUM BROMIDE 10 MG: 10 INJECTION INTRAVENOUS at 11:06

## 2018-02-14 RX ADMIN — DEXAMETHASONE SODIUM PHOSPHATE 8 MG: 4 INJECTION INTRA-ARTICULAR; INTRALESIONAL; INTRAMUSCULAR; INTRAVENOUS; SOFT TISSUE at 10:39

## 2018-02-14 RX ADMIN — SUGAMMADEX 200 MG: 100 INJECTION, SOLUTION INTRAVENOUS at 12:31

## 2018-02-14 RX ADMIN — EPHEDRINE SULFATE 5 MG: 50 INJECTION, SOLUTION INTRAMUSCULAR; INTRAVENOUS; SUBCUTANEOUS at 11:40

## 2018-02-14 RX ADMIN — EPHEDRINE SULFATE 5 MG: 50 INJECTION, SOLUTION INTRAMUSCULAR; INTRAVENOUS; SUBCUTANEOUS at 11:50

## 2018-02-14 RX ADMIN — EPHEDRINE SULFATE 5 MG: 50 INJECTION, SOLUTION INTRAMUSCULAR; INTRAVENOUS; SUBCUTANEOUS at 11:21

## 2018-02-14 RX ADMIN — ONDANSETRON 4 MG: 2 INJECTION INTRAMUSCULAR; INTRAVENOUS at 11:53

## 2018-02-14 RX ADMIN — EPHEDRINE SULFATE 5 MG: 50 INJECTION, SOLUTION INTRAMUSCULAR; INTRAVENOUS; SUBCUTANEOUS at 12:10

## 2018-02-14 RX ADMIN — GLYCOPYRROLATE 0.4 MG: 0.2 INJECTION INTRAMUSCULAR; INTRAVENOUS at 12:09

## 2018-02-14 RX ADMIN — Medication 0.1 MG: at 11:00

## 2018-02-14 RX ADMIN — PROPOFOL 20 MG: 10 INJECTION, EMULSION INTRAVENOUS at 12:15

## 2018-02-14 RX ADMIN — ALUMINUM HYDROXIDE, MAGNESIUM HYDROXIDE, AND SIMETHICONE 30 ML: 200; 200; 20 SUSPENSION ORAL at 13:56

## 2018-02-14 RX ADMIN — EPHEDRINE SULFATE 10 MG: 50 INJECTION, SOLUTION INTRAMUSCULAR; INTRAVENOUS; SUBCUTANEOUS at 12:12

## 2018-02-14 RX ADMIN — ESMOLOL HYDROCHLORIDE 10 MG: 100 INJECTION, SOLUTION INTRAVENOUS at 12:21

## 2018-02-14 RX ADMIN — PROPOFOL 200 MG: 10 INJECTION, EMULSION INTRAVENOUS at 10:26

## 2018-02-14 RX ADMIN — SODIUM CHLORIDE, POTASSIUM CHLORIDE, SODIUM LACTATE AND CALCIUM CHLORIDE: 600; 310; 30; 20 INJECTION, SOLUTION INTRAVENOUS at 11:56

## 2018-02-14 RX ADMIN — ESMOLOL HYDROCHLORIDE 10 MG: 100 INJECTION, SOLUTION INTRAVENOUS at 12:23

## 2018-02-14 RX ADMIN — PROPOFOL 20 MG: 10 INJECTION, EMULSION INTRAVENOUS at 12:17

## 2018-02-14 RX ADMIN — ROCURONIUM BROMIDE 20 MG: 10 INJECTION INTRAVENOUS at 11:28

## 2018-02-14 RX ADMIN — EPHEDRINE SULFATE 5 MG: 50 INJECTION, SOLUTION INTRAMUSCULAR; INTRAVENOUS; SUBCUTANEOUS at 11:09

## 2018-02-14 RX ADMIN — Medication 0.1 MG: at 12:00

## 2018-02-14 RX ADMIN — ROCURONIUM BROMIDE 50 MG: 10 INJECTION INTRAVENOUS at 10:27

## 2018-02-14 RX ADMIN — ESMOLOL HYDROCHLORIDE 10 MG: 100 INJECTION, SOLUTION INTRAVENOUS at 12:19

## 2018-02-14 RX ADMIN — MIDAZOLAM HYDROCHLORIDE 2 MG: 1 INJECTION, SOLUTION INTRAMUSCULAR; INTRAVENOUS at 10:20

## 2018-02-14 RX ADMIN — LIDOCAINE HYDROCHLORIDE 100 MG: 20 INJECTION, SOLUTION INFILTRATION; PERINEURAL at 10:26

## 2018-02-14 RX ADMIN — Medication 0.1 MG: at 11:02

## 2018-02-14 RX ADMIN — ROCURONIUM BROMIDE 20 MG: 10 INJECTION INTRAVENOUS at 11:52

## 2018-02-14 RX ADMIN — PROPOFOL 40 MG: 10 INJECTION, EMULSION INTRAVENOUS at 12:13

## 2018-02-14 RX ADMIN — CEFAZOLIN SODIUM 2 G: 2 SOLUTION INTRAVENOUS at 10:20

## 2018-02-14 RX ADMIN — FENTANYL CITRATE 100 MCG: 50 INJECTION, SOLUTION INTRAMUSCULAR; INTRAVENOUS at 10:26

## 2018-02-14 RX ADMIN — KETOROLAC TROMETHAMINE 30 MG: 30 INJECTION, SOLUTION INTRAMUSCULAR at 15:51

## 2018-02-14 RX ADMIN — ESMOLOL HYDROCHLORIDE 20 MG: 100 INJECTION, SOLUTION INTRAVENOUS at 12:18

## 2018-02-14 RX ADMIN — HEPARIN SODIUM 5000 UNITS: 5000 INJECTION, SOLUTION INTRAVENOUS; SUBCUTANEOUS at 15:51

## 2018-02-14 ASSESSMENT — PULMONARY FUNCTION TESTS
PIF_VALUE: 20
PIF_VALUE: 19
PIF_VALUE: 17
PIF_VALUE: 21
PIF_VALUE: 19
PIF_VALUE: 19
PIF_VALUE: 21
PIF_VALUE: 2
PIF_VALUE: 20
PIF_VALUE: 17
PIF_VALUE: 17
PIF_VALUE: 18
PIF_VALUE: 17
PIF_VALUE: 21
PIF_VALUE: 13
PIF_VALUE: 20
PIF_VALUE: 19
PIF_VALUE: 18
PIF_VALUE: 16
PIF_VALUE: 20
PIF_VALUE: 17
PIF_VALUE: 18
PIF_VALUE: 20
PIF_VALUE: 17
PIF_VALUE: 20
PIF_VALUE: 20
PIF_VALUE: 19
PIF_VALUE: 17
PIF_VALUE: 20
PIF_VALUE: 19
PIF_VALUE: 20
PIF_VALUE: 17
PIF_VALUE: 20
PIF_VALUE: 8
PIF_VALUE: 2
PIF_VALUE: 20
PIF_VALUE: 20
PIF_VALUE: 18
PIF_VALUE: 20
PIF_VALUE: 17
PIF_VALUE: 16
PIF_VALUE: 17
PIF_VALUE: 19
PIF_VALUE: 17
PIF_VALUE: 3
PIF_VALUE: 17
PIF_VALUE: 12
PIF_VALUE: 17
PIF_VALUE: 21
PIF_VALUE: 17
PIF_VALUE: 17
PIF_VALUE: 18
PIF_VALUE: 20
PIF_VALUE: 4
PIF_VALUE: 21
PIF_VALUE: 18
PIF_VALUE: 20
PIF_VALUE: 19
PIF_VALUE: 21
PIF_VALUE: 23
PIF_VALUE: 17
PIF_VALUE: 21
PIF_VALUE: 17
PIF_VALUE: 21
PIF_VALUE: 17
PIF_VALUE: 21
PIF_VALUE: 17
PIF_VALUE: 20
PIF_VALUE: 2
PIF_VALUE: 5
PIF_VALUE: 17
PIF_VALUE: 21
PIF_VALUE: 18
PIF_VALUE: 19
PIF_VALUE: 20
PIF_VALUE: 19
PIF_VALUE: 20
PIF_VALUE: 16
PIF_VALUE: 15
PIF_VALUE: 17
PIF_VALUE: 17
PIF_VALUE: 21
PIF_VALUE: 17
PIF_VALUE: 20
PIF_VALUE: 17
PIF_VALUE: 20
PIF_VALUE: 21
PIF_VALUE: 17
PIF_VALUE: 10
PIF_VALUE: 20
PIF_VALUE: 21
PIF_VALUE: 19
PIF_VALUE: 17
PIF_VALUE: 17
PIF_VALUE: 3
PIF_VALUE: 17
PIF_VALUE: 16
PIF_VALUE: 18
PIF_VALUE: 21
PIF_VALUE: 17
PIF_VALUE: 20
PIF_VALUE: 17
PIF_VALUE: 23
PIF_VALUE: 19
PIF_VALUE: 18
PIF_VALUE: 20
PIF_VALUE: 17
PIF_VALUE: 21
PIF_VALUE: 20
PIF_VALUE: 6
PIF_VALUE: 19
PIF_VALUE: 17
PIF_VALUE: 20
PIF_VALUE: 21
PIF_VALUE: 17
PIF_VALUE: 22
PIF_VALUE: 17
PIF_VALUE: 19
PIF_VALUE: 20
PIF_VALUE: 20

## 2018-02-14 ASSESSMENT — PAIN - FUNCTIONAL ASSESSMENT: PAIN_FUNCTIONAL_ASSESSMENT: 0-10

## 2018-02-14 ASSESSMENT — PAIN SCALES - GENERAL: PAINLEVEL_OUTOF10: 1

## 2018-02-14 NOTE — CONSULTS
week & had a seizure    Hyperlipidemia     meds since 1996    MI (myocardial infarction) 1996     CABG x 4 with aortic patch / 2010 2 cardiac stents       Past Surgical History:  Past Surgical History:   Procedure Laterality Date    ABDOMEN SURGERY      large wound revision with mesh / due to non healing wound    CARDIAC SURGERY      CABG x 4 / aortic patch / 2 cardiac stents    COLONOSCOPY      HERNIA REPAIR      bilateral inguinal & umbilical hernia repairs    OTHER SURGICAL HISTORY      due to radical prostatectomy /artificial bladder sphincter    PATELLA FRACTURE SURGERY Right 1979    PROSTATE SURGERY  2000    radical prostatectomy due to cancer / no trx to follow    TRACHEOTOMY  2011    placement with then closure       Family History:       Problem Relation Age of Onset    Diabetes Mother     Heart Disease Father     High Cholesterol Sister     High Cholesterol Brother     Heart Disease Brother        Social  History:     Social History   Substance Use Topics    Smoking status: Never Smoker    Smokeless tobacco: Never Used    Alcohol use No       Home Medications:    Prior to Admission medications    Medication Sig Start Date End Date Taking?  Authorizing Provider   Coenzyme Q10 (COQ-10) 100 MG CAPS Take by mouth daily   Yes Historical Provider, MD   Evolocumab (REPATHA) 140 MG/ML SOSY Inject into the skin every 14 days   Yes Historical Provider, MD   acetaminophen (TYLENOL) 325 MG tablet Take 650 mg by mouth every 6 hours as needed for Pain   Yes Historical Provider, MD   aspirin 81 MG tablet Take 81 mg by mouth   Yes Historical Provider, MD   carvedilol (COREG) 3.125 MG tablet Take 3.125 mg by mouth   Yes Historical Provider, MD   clonazePAM (KLONOPIN) 2 MG tablet TAKE 1 TABLET BY MOUTH TWICE A DAY 11/25/17  Yes Historical Provider, MD   Multiple Vitamin (MULTI-VITAMINS) TABS TAKE 1 TABLET BY MOUTH EVERY DAY 1/3/18  Yes Historical Provider, MD   lovastatin (MEVACOR) 40 MG tablet Take 80 mg by mouth   Yes Historical Provider, MD   niacin (NIASPAN) 1000 MG extended release tablet Take 1,000 mg by mouth   Yes Historical Provider, MD   ramipril (ALTACE) 10 MG capsule Take 10 mg by mouth 7/29/16  Yes Historical Provider, MD   temazepam (RESTORIL) 30 MG capsule TAKE ONE CAPSULE BY MOUTH AT BEDTIME AS NEEDED 11/25/17  Yes Historical Provider, MD   gabapentin (NEURONTIN) 600 MG tablet Take 1 tablet by mouth 3 times daily as needed (nerve pain) for up to 30 days. 2/2/18 3/4/18  Francesca Case DO   gabapentin (NEURONTIN) 300 MG capsule Take 1 capsule by mouth nightly as needed (nerve pain) for up to 30 days. 1/9/18 2/8/18  Francesca Case DO       Current Medications:        ROS:   12 point review of systems was obtained in detail and is negative other than that noted above or below. Vital Signs:  Vitals:    02/09/18 1305   BP: 104/72   Pulse: 73   Resp: 16   Temp: 99 °F (37.2 °C)   TempSrc: Temporal   SpO2: 94%   Weight: 195 lb (88.5 kg)   Height: 5' 10.5\" (1.791 m)       Physical Examination:  GENERAL APPEARANCE: Well developed, well nourished, in no acute distress. CHEST: Symmetric and non-tender. INTEGUMENT: Skin warm and dry, without gross excoriationis or lesions. HEENT: No gross abnormalities of conjunctiva, teeth, gums, oral mucosa  NECK: Supple, no JVD, no bruit. Thyroid not palpable. Carotid upstrokes normal.  NEURO/PSHCY: Alert and oriented x3; appropriate behavior and responses and responses, grossly normal cerebellar function with normal balance and coordination  LUNGS: Clear to auscultation bilaterally; normal respiratory effort. HEART: Rate and rhythm regular with no evident murmur; no gallop appreciated. There are no rubs, clicks or heaves. PMI nondisplaced. ABDOMEN: Soft, nontender, no palpable hepatosplenomegaly, no mases, no bruits. Abdominal aorta not noted to be enlarged. MUSCULOSKELETAL: Ambulatory with normal tandem gait.   EXTREMITIES: Warm with good color, no clubbing or

## 2018-02-14 NOTE — H&P
Initial consult     2/6/2018  Morena Dural. Mike Arnett MD   Neurosurgery   Herniated lumbar intervertebral disc +2 more   Dx   Lower Back Pain ; Referred by Remington Avendano DO   Reason for Visit    Progress Notes     Expand All Collapse All               Patient:  Christy Martinez  YOB: 1950  Date: 2/6/18     The patient is a 78 y.o. male who presents today for evaluation of the following problems: Severe back and right leg pain.           Chief Complaint   Patient presents with    Lower Back Pain       lumbar pain, right leg pain         Referred by Onetha Lesches, DO           Estimated body mass index is 25.77 kg/m² as calculated from the following:    Height as of this encounter: 6' (1.829 m). Weight as of this encounter: 190 lb (86.2 kg).       PAST MEDICAL, FAMILY AND SOCIAL HISTORY:  Past Medical History   No past medical history on file. Past Surgical History   No past surgical history on file. Family History   No family history on file. Current Outpatient Prescriptions on File Prior to Visit   Medication Sig Dispense Refill    aspirin 81 MG tablet Take 81 mg by mouth        gabapentin (NEURONTIN) 600 MG tablet Take 1 tablet by mouth 3 times daily as needed (nerve pain) for up to 30 days.  90 tablet 0    amoxicillin-clavulanate (AUGMENTIN) 875-125 MG per tablet TAKE 1 TABLET BY MOUTH EVERY 12 HOURS FOR 10 DAYS   0    carvedilol (COREG) 3.125 MG tablet Take 3.125 mg by mouth        clonazePAM (KLONOPIN) 2 MG tablet TAKE 1 TABLET BY MOUTH TWICE A DAY   0    Multiple Vitamin (MULTI-VITAMINS) TABS TAKE 1 TABLET BY MOUTH EVERY DAY   3    lovastatin (MEVACOR) 40 MG tablet Take 80 mg by mouth        niacin (NIASPAN) 1000 MG extended release tablet Take 1,000 mg by mouth        ramipril (ALTACE) 10 MG capsule Take 10 mg by mouth        temazepam (RESTORIL) 30 MG capsule TAKE ONE CAPSULE BY MOUTH AT BEDTIME AS NEEDED   0    gabapentin (NEURONTIN) 300 MG

## 2018-02-14 NOTE — ANESTHESIA POSTPROCEDURE EVALUATION
Department of Anesthesiology  Postprocedure Note    Patient: Rolando Tolliver  MRN: [id number]  [place]: 1950  Date of evaluation: 2/14/2018  Time:  12:47 PM     Procedure Summary     Date:  02/14/18 Room / Location:  [place] [address] / [facility]    Anesthesia Start:  1020 Anesthesia Stop:  1820    Procedure:  EXTRAFORAMINAL L 3-4 MICRODISKECTOMY, RIGHT  RIGHT L 3-4 LAMINECTOMY (N/A Spine Lumbar) Diagnosis:  (HERNIATED DISC L 3-4 LATERAL)    Surgeon:  Yudith Bobo MD Responsible Provider:  Jerry Burrell MD    Anesthesia Type:  general ASA Status:  3          Anesthesia Type: general    Dmitriy Phase I:      Dmitriy Phase II:      Last vitals: Reviewed and per EMR flowsheets.        Anesthesia Post Evaluation    Patient location during evaluation: bedside  Patient participation: complete - patient participated  Level of consciousness: awake and awake and alert  Airway patency: patent  Nausea & Vomiting: no nausea and no vomiting  Complications: no  Cardiovascular status: blood pressure returned to baseline and hemodynamically stable  Respiratory status: acceptable  Hydration status: euvolemic

## 2018-02-14 NOTE — DISCHARGE SUMMARY
Discharge summary  This patient Avtar Jones was admitted to the hospital on 2/14/2018  after undergoing Procedure(s) (LRB):  EXTRAFORAMINAL L 3-4 MICRODISKECTOMY, RIGHT  RIGHT L 3-4 LAMINECTOMY (N/A) without complications that morning. During the postoperative period,while in hospital, patient was medically managed Please see medial notes and H&P for patients additional diagnoses. Ortho agrees with all medical diagnoses and treatments while patient in hospital.    Hospital course  Patient tolerated surgical procedure well and there was no complications. Patient progressed adequtly through their recovery during hospital stay including PT and rehabilitation. Patient was then D/C on  to Home  in stable condition. Patient was instructed on the use of pain medications, the signs and symptoms of infection, and was given our number to call should they have any questions or concerns following discharge.

## 2018-02-14 NOTE — BRIEF OP NOTE
Brief Postoperative Note  ______________________________________________________________    Patient: Lelo Hyman  YOB: 1950  MRN: [id number]  Date of Procedure: 2/14/2018    Pre-Op Diagnosis: HERNIATED DISC L 3-4 LATERAL    Post-Op Diagnosis: Same       Procedure(s):  EXTRAFORAMINAL L 3-4 MICRODISKECTOMY, RIGHT  RIGHT L 3-4 LAMINECTOMY    Anesthesia: General    Surgeon(s):  Bryce Joseph MD    Staff:  First Assistant: Ryan Ahuja RN  Scrub Person First: Mary Anne Richardson     Estimated Blood Loss: 99LQ    Complications: None    Specimens:   * No specimens in log *    Implants:  * No implants in log *      Drains:      Findings: lateral hnp    Linette Encinas MD  Date: 2/14/2018  Time: 12:22 PM

## 2018-02-14 NOTE — ANESTHESIA PRE PROCEDURE
this encounter. Current Outpatient Prescriptions   Medication Sig Dispense Refill    Coenzyme Q10 (COQ-10) 100 MG CAPS Take by mouth daily      Evolocumab (REPATHA) 140 MG/ML SOSY Inject into the skin every 14 days      acetaminophen (TYLENOL) 325 MG tablet Take 650 mg by mouth every 6 hours as needed for Pain      gabapentin (NEURONTIN) 600 MG tablet Take 1 tablet by mouth 3 times daily as needed (nerve pain) for up to 30 days. 90 tablet 0    aspirin 81 MG tablet Take 81 mg by mouth      carvedilol (COREG) 3.125 MG tablet Take 3.125 mg by mouth      clonazePAM (KLONOPIN) 2 MG tablet TAKE 1 TABLET BY MOUTH TWICE A DAY  0    Multiple Vitamin (MULTI-VITAMINS) TABS TAKE 1 TABLET BY MOUTH EVERY DAY  3    lovastatin (MEVACOR) 40 MG tablet Take 80 mg by mouth      niacin (NIASPAN) 1000 MG extended release tablet Take 1,000 mg by mouth      ramipril (ALTACE) 10 MG capsule Take 10 mg by mouth      temazepam (RESTORIL) 30 MG capsule TAKE ONE CAPSULE BY MOUTH AT BEDTIME AS NEEDED  0    gabapentin (NEURONTIN) 300 MG capsule Take 1 capsule by mouth nightly as needed (nerve pain) for up to 30 days. 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 difficile diarrhea A04.72       Past Medical History:        Diagnosis Date    CAD (coronary artery disease)     CABG X 4 / Aortic patch / 2010 2 cardiac stents    Cancer [facility])     prostate with OR    Hx of blood clots 2002    brain venous thrombosis / had been off aspirin for 1 week & had a seizure    Hyperlipidemia     meds since 1996    MI (myocardial infarction) 1996     CABG x 4 with aortic patch / 2010 2 cardiac stents       Past Surgical History:        Procedure Laterality Date    ABDOMEN SURGERY      large wound revision with mesh / due to non healing wound    CARDIAC SURGERY      CABG x 4 / aortic patch / 2 cardiac stents    COLONOSCOPY      HERNIA REPAIR      bilateral inguinal & umbilical hernia repairs    OTHER SURGICAL HISTORY      due to radical prostatectomy /artificial bladder sphincter    PATELLA FRACTURE SURGERY Right 1979    PROSTATE SURGERY  2000    radical prostatectomy due to cancer / no trx to follow    TRACHEOTOMY  2011    placement with then closure       Social History:    Social History   Substance Use Topics    Smoking status: Never Smoker    Smokeless tobacco: Never Used    Alcohol use No                                Counseling given: Not Answered      Vital Signs (Current): There were no vitals filed for this visit.                                            BP Readings from Last 3 Encounters:   02/09/18 104/72       NPO Status:                                                                                 BMI:   Wt Readings from Last 3 Encounters:   02/09/18 195 lb (88.5 kg)   02/06/18 190 lb (86.2 kg)   02/02/18 190 lb (86.2 kg)     There is no height or weight on file to calculate BMI.    CBC:   Lab Results   Component Value Date    WBC 6.2 02/09/2018    RBC 4.88 02/09/2018    HGB 14.9 02/09/2018    HCT 44.8 02/09/2018    MCV 91.8 02/09/2018    RDW 14.7 02/09/2018     02/09/2018       CMP:   Lab Results   Component Value Date     02/09/2018    K 5.1 02/09/2018     02/09/2018    CO2 26 02/09/2018    BUN 21 02/09/2018    CREATININE 1.08 02/09/2018    GFRAA >60.0 02/09/2018    LABGLOM >60.0

## 2018-02-14 NOTE — PROGRESS NOTES
Pt up to the floor, denies pain, denies SOB, denies N&V. Denies chest pain. Pt A&OX4, Pt up in bed eating ice chips at this time. Vitals WNL. Wife at bed side.

## 2018-02-15 NOTE — OP NOTE
[address]                       [address], [address] [facility]                                 OPERATIVE REPORT    PATIENT NAME: Radha Osborne                   :        1950  MED REC NO:   [id number]                            ROOM:       H983  ACCOUNT NO:   [de-identified]                           ADMIT DATE: 2018  PROVIDER:     Ynes Marie MD    DATE OF PROCEDURE:  2018    PREOPERATIVE DIAGNOSES:  1. Lateral disc herniation at L3-4 with significant L3 radiculopathy. 2.  Central protrusion and stenosis at L3-4. OPERATION PERFORMED:  Extraforaminal L3-4 microdiscectomy followed by a  right L3-4 decompressive laminectomy via the same skin incision but through  a separate fascial incision. SURGEON:  Ynes Marie MD.    HISTORY:  The patient is a 61-year-old gentleman who presents with severe  pain radiating into the back into the right thigh, into the knee, but no  further. Could not get out of bed. He has had tried extensive physical  therapy and other conservative treatment. He was found to have a large  disc herniation L3-4 going into the neuroforamen compressing the L3 nerve  root. He also had more central component because of significant spinal  stenosis. The patient was offered laminectomy and discectomy. The  procedure and the risk discussed and he understood and wished to proceed. OPERATIVE PROCEDURE:  He was intubated under general endotracheal  anesthesia, and placed in the prone position. The back being prepped and  draped in the usual sterile fashion. X-ray was taken to verify the proper  level. An incision was made straddling the L3 and L4 pedicle, carried down  through skin, through subcutaneous tissue down to the fascia. The fascia  was incised. We then went through the fascia and then followed an  avascular plane just lateral to the multifidus, identifying the lateral  mass of L3.   A self-retaining retractor was used to maintain this exposure. A 3.5 powered loupe magnification with headlight illumination was used at  this point in time. I removed a small portion of the pars of L3 and then  the ligamentum flavum. Microscope was hung into place. We delineated the  L3 nerve root as we were on the pedicle and then on elevating, we  encountered the ruptured fragment. We were slowly able to remove this  using combination of hook and Penfield dissectors and removing in a  piecemeal fashion, removing at least 6 to 7 fragments, decompressing the  nerve root very nicely. The wound was irrigated. The fascia was  approximated using 2-0 Vicryl. We then reopened the fascia more medially  next to the spinous process of L3 and then stripping the muscle in  subperiosteal fashion. Then, placing a self-retaining retractor, we now  drilled away the inferior lamina of L3, the medial aspect of inferior  articulation being careful to protect the pars and then thinned down the  bone, removed it with Kerrison rongeurs, then removed the thick ligamentum  flavum, then the superior lamina of L4, medial aspect of superior  articulation of L4, decompressing the canal widely. Wound was irrigated. Fascia then approximated using 2-0 Vicryl interrupted fashion, 2-0 Vicryl  was used interrupted fashion to approximate superficial fascia, 3-0 Vicryl  interrupted inverted manner for subdermal layer, and 4-0 Vicryl for the  skin. Dressing was applied. The patient was then extubated and returned  to the recovery room. Estimated blood pressure for the procedure 50 mL. No blood was given. No drains used.         Lillie Munguia MD    D: 02/14/2018 13:21:23       T: 02/15/2018 0:49:41     [email or web address]  Job#: [id number]     Doc#: [id number]    CC:

## 2018-02-15 NOTE — PROGRESS NOTES
Pt dressing clean dry and intact. No swelling noted, pt foot pump/push equal, hand grasp equal, pt denies pain. Pt denies chest pain. Call light within reach no distress noted.

## 2018-02-17 LAB
BLOOD BANK DISPENSE STATUS: NORMAL
BLOOD BANK DISPENSE STATUS: NORMAL
BLOOD BANK PRODUCT CODE: NORMAL
BLOOD BANK PRODUCT CODE: NORMAL
BPU ID: NORMAL
BPU ID: NORMAL
DESCRIPTION BLOOD BANK: NORMAL
DESCRIPTION BLOOD BANK: NORMAL

## 2018-02-28 ENCOUNTER — HOSPITAL ENCOUNTER (OUTPATIENT)
Dept: PHYSICAL THERAPY | Age: 68
Setting detail: THERAPIES SERIES
Discharge: HOME | End: 2018-02-28
Payer: COMMERCIAL

## 2018-02-28 PROCEDURE — 97530 THERAPEUTIC ACTIVITIES: CPT

## 2018-02-28 PROCEDURE — 97110 THERAPEUTIC EXERCISES: CPT

## 2018-02-28 PROCEDURE — G8984 CARRY CURRENT STATUS: HCPCS

## 2018-02-28 PROCEDURE — G8985 CARRY GOAL STATUS: HCPCS

## 2018-02-28 PROCEDURE — 97162 PT EVAL MOD COMPLEX 30 MIN: CPT

## 2018-02-28 ASSESSMENT — PAIN SCALES - GENERAL: PAINLEVEL_OUTOF10: 0

## 2018-02-28 ASSESSMENT — PAIN DESCRIPTION - PAIN TYPE: TYPE: SURGICAL PAIN

## 2018-03-02 ENCOUNTER — HOSPITAL ENCOUNTER (OUTPATIENT)
Dept: PHYSICAL THERAPY | Age: 68
Setting detail: THERAPIES SERIES
Discharge: HOME | End: 2018-03-02
Payer: COMMERCIAL

## 2018-03-02 PROCEDURE — 97110 THERAPEUTIC EXERCISES: CPT

## 2018-03-02 PROCEDURE — 97530 THERAPEUTIC ACTIVITIES: CPT

## 2018-03-02 NOTE — PROGRESS NOTES
inocencio/per protocol. Treatment Diagnosis: Lumbar herniated disc s/p L 3-4 microdiskectomy and laminectomy on 2/14/18  Patient Education: Pt educated at length on abdominal bracing, use of towel roll when sitting and importance of upright posture throughout the day. REQUIRES PT FOLLOW UP: Yes      G-Code:     OutComes Score                                           Goals:  Short term goals  Time Frame for Short term goals: 1-2 weeks  Short term goal 1: Pt reports low back pain 2/10 or less with increased mobility  Long term goals  Time Frame for Long term goals : 4-5 weeks  Long term goal 1: Pt reports low back pain 1/10 or less with increased mobility  Long term goal 2: Increase B LE strength and core strength to 4+/5 so pt can do work duties painfree  Long term goal 3: Pt reports able to ride of his tractor and perform work duties comfortably. Long term goal 4: Improve Oswestry to <20% or better  Long term goal 5:  Indep with HEP  Patient Goals   Patient goals : Get back to normal    Plan:      Plan  Times per week: 2-3x per week for 4-5 weeks=10 visits  Current Treatment Recommendations: Strengthening, Balance Training, Functional Mobility Training, Endurance Training, Neuromuscular Re-education, Pain Management, Home Exercise Program, Modalities  Plan Comment: Estim, US, HP/CP, KT tape  Frequency and duration of tx  Days:  (2-3)  Weeks:  (4-5)     Therapy Time   Individual Concurrent Group Co-treatment   Time In  0900         Time Out  1000         Minutes  Akhil Wagner, PAT  License and [address] Number: [id number]

## 2018-03-05 ENCOUNTER — HOSPITAL ENCOUNTER (OUTPATIENT)
Dept: PHYSICAL THERAPY | Age: 68
Setting detail: THERAPIES SERIES
Discharge: HOME | End: 2018-03-05
Payer: COMMERCIAL

## 2018-03-05 PROCEDURE — 97110 THERAPEUTIC EXERCISES: CPT

## 2018-03-05 NOTE — PROGRESS NOTES
herniated disc s/p L 3-4 microdiskectomy and laminectomy on 2/14/18  Patient Education: Educated pt on posture throughout session to decrease strain on spine. 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 Long term goal 4: Improve Oswestry to <20% or better  Long term goal 5:  Indep with HEP  Patient Goals   Patient goals : Get back to normal    Plan:      Plan  Times per week: 2-3x per week for 4-5 weeks=10 visits  Current Treatment Recommendations: Strengthening, Balance Training, Functional Mobility Training, Endurance Training, Neuromuscular Re-education, Pain Management, Home Exercise Program, Modalities  Plan Comment: Estim, US, HP/CP, KT tape  Frequency and duration of tx  Days:  (2-3)  Weeks:  (4-5)     Therapy Time   Individual Concurrent Group Co-treatment   Time In  0900         Time Out  1000         Minutes  Akhil Wagner, PAT  License and [address] Number: [id number]

## 2018-03-07 ENCOUNTER — HOSPITAL ENCOUNTER (OUTPATIENT)
Dept: PHYSICAL THERAPY | Age: 68
Setting detail: THERAPIES SERIES
Discharge: HOME | End: 2018-03-07
Payer: COMMERCIAL

## 2018-03-07 PROCEDURE — 97110 THERAPEUTIC EXERCISES: CPT

## 2018-03-09 ENCOUNTER — HOSPITAL ENCOUNTER (OUTPATIENT)
Dept: PHYSICAL THERAPY | Age: 68
Setting detail: THERAPIES SERIES
Discharge: HOME | End: 2018-03-09
Payer: COMMERCIAL

## 2018-03-09 PROCEDURE — 97110 THERAPEUTIC EXERCISES: CPT

## 2018-03-14 ENCOUNTER — HOSPITAL ENCOUNTER (OUTPATIENT)
Dept: PHYSICAL THERAPY | Age: 68
Setting detail: THERAPIES SERIES
Discharge: HOME | End: 2018-03-14
Payer: COMMERCIAL

## 2018-03-14 PROCEDURE — 97530 THERAPEUTIC ACTIVITIES: CPT

## 2018-03-14 PROCEDURE — 97110 THERAPEUTIC EXERCISES: CPT

## 2018-03-14 PROCEDURE — G8984 CARRY CURRENT STATUS: HCPCS

## 2018-03-14 PROCEDURE — G8986 CARRY D/C STATUS: HCPCS

## 2018-03-14 PROCEDURE — G8985 CARRY GOAL STATUS: HCPCS

## 2018-03-14 ASSESSMENT — PAIN DESCRIPTION - PAIN TYPE: TYPE: SURGICAL PAIN

## 2018-03-14 ASSESSMENT — PAIN SCALES - GENERAL: PAINLEVEL_OUTOF10: 0

## 2018-03-14 NOTE — PROGRESS NOTES
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 (Able to ride short distance & limited with lifting -NOT MET)  Long term goal 4: Improve Oswestry to <20% or better (Oswestry at DC =20% - almost met)  Long term goal 5:  Indep with HEP (GOAL MET)  Patient Goals   Patient goals : Get back to normal       Therapy Time   Individual Concurrent Group Co-treatment   Time In  10:00am         Time Out  11:00am         Minutes  60 min                 MYRIAM Plata#[id number]

## 2018-07-10 ENCOUNTER — HOSPITAL ENCOUNTER (OUTPATIENT)
Age: 68
Discharge: HOME | End: 2018-07-12
Payer: COMMERCIAL

## 2018-07-10 ENCOUNTER — HOSPITAL ENCOUNTER (OUTPATIENT)
Dept: GENERAL RADIOLOGY | Age: 68
Discharge: HOME | End: 2018-07-12
Payer: COMMERCIAL

## 2018-07-10 DIAGNOSIS — R04.2 BLOOD IN SPUTUM: ICD-10-CM

## 2018-07-10 PROCEDURE — 71046 X-RAY EXAM CHEST 2 VIEWS: CPT

## 2018-09-07 ENCOUNTER — HOSPITAL ENCOUNTER (OUTPATIENT)
Dept: LAB | Age: 68
Discharge: HOME | End: 2018-09-07
Payer: COMMERCIAL

## 2018-09-07 LAB
ANION GAP SERPL CALCULATED.3IONS-SCNC: 12 MEQ/L (ref 7–13)
BUN BLDV-MCNC: 22 MG/DL (ref 8–23)
CALCIUM SERPL-MCNC: 9.1 MG/DL (ref 8.6–10.2)
CHLORIDE BLD-SCNC: 101 MEQ/L (ref 98–107)
CO2: 22 MEQ/L (ref 22–29)
CREAT SERPL-MCNC: 1.38 MG/DL (ref 0.7–1.2)
GFR AFRICAN AMERICAN: >60
GFR NON-AFRICAN AMERICAN: 51.4
GLUCOSE BLD-MCNC: 105 MG/DL (ref 74–109)
POTASSIUM SERPL-SCNC: 4.9 MEQ/L (ref 3.5–5.1)
SODIUM BLD-SCNC: 135 MEQ/L (ref 132–144)

## 2018-09-07 PROCEDURE — 36415 COLL VENOUS BLD VENIPUNCTURE: CPT

## 2018-09-07 PROCEDURE — 80048 BASIC METABOLIC PNL TOTAL CA: CPT

## 2019-02-02 ENCOUNTER — HOSPITAL ENCOUNTER (OUTPATIENT)
Dept: LAB | Age: 69
Discharge: HOME | End: 2019-02-02
Payer: COMMERCIAL

## 2019-02-02 LAB
ALBUMIN SERPL-MCNC: 4.1 G/DL (ref 3.9–4.9)
ALP BLD-CCNC: 50 U/L (ref 35–104)
ALT SERPL-CCNC: 15 U/L (ref 0–41)
ANION GAP SERPL CALCULATED.3IONS-SCNC: 11 MEQ/L (ref 7–13)
AST SERPL-CCNC: 20 U/L (ref 0–40)
BILIRUB SERPL-MCNC: 0.4 MG/DL (ref 0–1.2)
BUN BLDV-MCNC: 33 MG/DL (ref 8–23)
CALCIUM SERPL-MCNC: 8.8 MG/DL (ref 8.6–10.2)
CHLORIDE BLD-SCNC: 108 MEQ/L (ref 98–107)
CHOLESTEROL, TOTAL: 150 MG/DL (ref 0–199)
CO2: 24 MEQ/L (ref 22–29)
CREAT SERPL-MCNC: 1.62 MG/DL (ref 0.7–1.2)
GFR AFRICAN AMERICAN: 51.6
GFR NON-AFRICAN AMERICAN: 42.6
GLOBULIN: 2.8 G/DL (ref 2.3–3.5)
GLUCOSE BLD-MCNC: 98 MG/DL (ref 74–109)
HDLC SERPL-MCNC: 46 MG/DL (ref 40–59)
LDL CHOLESTEROL CALCULATED: 89 MG/DL (ref 0–129)
POTASSIUM SERPL-SCNC: 4.8 MEQ/L (ref 3.5–5.1)
SODIUM BLD-SCNC: 143 MEQ/L (ref 132–144)
TOTAL PROTEIN: 6.9 G/DL (ref 6.4–8.1)
TRIGL SERPL-MCNC: 77 MG/DL (ref 0–200)

## 2019-02-02 PROCEDURE — 36415 COLL VENOUS BLD VENIPUNCTURE: CPT

## 2019-02-02 PROCEDURE — 80061 LIPID PANEL: CPT

## 2019-02-02 PROCEDURE — 80053 COMPREHEN METABOLIC PANEL: CPT

## 2020-12-24 ENCOUNTER — APPOINTMENT (OUTPATIENT)
Dept: CT IMAGING | Age: 70
DRG: 164 | End: 2020-12-24
Payer: MEDICARE

## 2020-12-24 ENCOUNTER — APPOINTMENT (OUTPATIENT)
Dept: GENERAL RADIOLOGY | Age: 70
DRG: 164 | End: 2020-12-24
Payer: MEDICARE

## 2020-12-24 ENCOUNTER — HOSPITAL ENCOUNTER (INPATIENT)
Age: 70
LOS: 2 days | Discharge: HOME | DRG: 164 | End: 2020-12-27
Attending: INTERNAL MEDICINE | Admitting: INTERNAL MEDICINE
Payer: MEDICARE

## 2020-12-24 LAB
ALBUMIN SERPL-MCNC: 3.9 G/DL (ref 3.5–4.6)
ALP BLD-CCNC: 55 U/L (ref 35–104)
ALT SERPL-CCNC: 13 U/L (ref 0–41)
ANION GAP SERPL CALCULATED.3IONS-SCNC: 12 MEQ/L (ref 9–15)
AST SERPL-CCNC: 21 U/L (ref 0–40)
BASOPHILS ABSOLUTE: 0 K/UL (ref 0–0.2)
BASOPHILS RELATIVE PERCENT: 0.6 %
BILIRUB SERPL-MCNC: <0.2 MG/DL (ref 0.2–0.7)
BUN BLDV-MCNC: 30 MG/DL (ref 8–23)
CALCIUM SERPL-MCNC: 8.6 MG/DL (ref 8.5–9.9)
CHLORIDE BLD-SCNC: 101 MEQ/L (ref 95–107)
CO2: 23 MEQ/L (ref 20–31)
CREAT SERPL-MCNC: 1.47 MG/DL (ref 0.7–1.2)
D DIMER: 14.11 MG/L FEU (ref 0–0.5)
EKG ATRIAL RATE: 89 BPM
EKG P AXIS: 18 DEGREES
EKG P-R INTERVAL: 182 MS
EKG Q-T INTERVAL: 370 MS
EKG QRS DURATION: 106 MS
EKG QTC CALCULATION (BAZETT): 450 MS
EKG R AXIS: 153 DEGREES
EKG T AXIS: 45 DEGREES
EKG VENTRICULAR RATE: 89 BPM
EOSINOPHILS ABSOLUTE: 0.3 K/UL (ref 0–0.7)
EOSINOPHILS RELATIVE PERCENT: 3.8 %
GFR AFRICAN AMERICAN: 57.4
GFR NON-AFRICAN AMERICAN: 47.4
GLOBULIN: 2.9 G/DL (ref 2.3–3.5)
GLUCOSE BLD-MCNC: 106 MG/DL (ref 70–99)
HCT VFR BLD CALC: 45.4 % (ref 42–52)
HEMOGLOBIN: 15.9 G/DL (ref 14–18)
LYMPHOCYTES ABSOLUTE: 1.8 K/UL (ref 1–4.8)
LYMPHOCYTES RELATIVE PERCENT: 25.1 %
MAGNESIUM: 2.5 MG/DL (ref 1.7–2.4)
MCH RBC QN AUTO: 32.5 PG (ref 27–31.3)
MCHC RBC AUTO-ENTMCNC: 35 % (ref 33–37)
MCV RBC AUTO: 92.9 FL (ref 80–100)
MONOCYTES ABSOLUTE: 0.6 K/UL (ref 0.2–0.8)
MONOCYTES RELATIVE PERCENT: 8.7 %
NEUTROPHILS ABSOLUTE: 4.4 K/UL (ref 1.4–6.5)
NEUTROPHILS RELATIVE PERCENT: 61.8 %
PDW BLD-RTO: 13.5 % (ref 11.5–14.5)
PLATELET # BLD: 140 K/UL (ref 130–400)
POTASSIUM SERPL-SCNC: 4.7 MEQ/L (ref 3.4–4.9)
PRO-BNP: 411 PG/ML
RBC # BLD: 4.89 M/UL (ref 4.7–6.1)
SODIUM BLD-SCNC: 136 MEQ/L (ref 135–144)
TOTAL CK: 89 U/L (ref 0–190)
TOTAL PROTEIN: 6.8 G/DL (ref 6.3–8)
TROPONIN: <0.01 NG/ML (ref 0–0.01)
WBC # BLD: 7.1 K/UL (ref 4.8–10.8)

## 2020-12-24 PROCEDURE — 71275 CT ANGIOGRAPHY CHEST: CPT

## 2020-12-24 PROCEDURE — 70486 CT MAXILLOFACIAL W/O DYE: CPT

## 2020-12-24 PROCEDURE — 99285 EMERGENCY DEPT VISIT HI MDM: CPT

## 2020-12-24 PROCEDURE — 83735 ASSAY OF MAGNESIUM: CPT

## 2020-12-24 PROCEDURE — 82550 ASSAY OF CK (CPK): CPT

## 2020-12-24 PROCEDURE — 6830039000 HC L3 TRAUMA ALERT

## 2020-12-24 PROCEDURE — 85025 COMPLETE CBC W/AUTO DIFF WBC: CPT

## 2020-12-24 PROCEDURE — 96374 THER/PROPH/DIAG INJ IV PUSH: CPT

## 2020-12-24 PROCEDURE — 36415 COLL VENOUS BLD VENIPUNCTURE: CPT

## 2020-12-24 PROCEDURE — 85379 FIBRIN DEGRADATION QUANT: CPT

## 2020-12-24 PROCEDURE — 84484 ASSAY OF TROPONIN QUANT: CPT

## 2020-12-24 PROCEDURE — 71045 X-RAY EXAM CHEST 1 VIEW: CPT

## 2020-12-24 PROCEDURE — 83880 ASSAY OF NATRIURETIC PEPTIDE: CPT

## 2020-12-24 PROCEDURE — 80053 COMPREHEN METABOLIC PANEL: CPT

## 2020-12-24 PROCEDURE — 6360000002 HC RX W HCPCS: Performed by: PHYSICIAN ASSISTANT

## 2020-12-24 PROCEDURE — 72125 CT NECK SPINE W/O DYE: CPT

## 2020-12-24 PROCEDURE — 70450 CT HEAD/BRAIN W/O DYE: CPT

## 2020-12-24 PROCEDURE — 93005 ELECTROCARDIOGRAM TRACING: CPT

## 2020-12-24 PROCEDURE — 6360000004 HC RX CONTRAST MEDICATION: Performed by: PHYSICIAN ASSISTANT

## 2020-12-24 PROCEDURE — 2580000003 HC RX 258: Performed by: PHYSICIAN ASSISTANT

## 2020-12-24 RX ORDER — COLCHICINE 0.6 MG/1
0.6 TABLET ORAL DAILY
COMMUNITY

## 2020-12-24 RX ORDER — MORPHINE SULFATE 4 MG/ML
4 INJECTION, SOLUTION INTRAMUSCULAR; INTRAVENOUS
Status: DISCONTINUED | OUTPATIENT
Start: 2020-12-24 | End: 2020-12-27 | Stop reason: HOSPADM

## 2020-12-24 RX ORDER — KETOROLAC TROMETHAMINE 15 MG/ML
15 INJECTION, SOLUTION INTRAMUSCULAR; INTRAVENOUS ONCE
Status: DISCONTINUED | OUTPATIENT
Start: 2020-12-24 | End: 2020-12-24

## 2020-12-24 RX ORDER — 0.9 % SODIUM CHLORIDE 0.9 %
1000 INTRAVENOUS SOLUTION INTRAVENOUS ONCE
Status: COMPLETED | OUTPATIENT
Start: 2020-12-24 | End: 2020-12-24

## 2020-12-24 RX ORDER — SACUBITRIL AND VALSARTAN 97; 103 MG/1; MG/1
1 TABLET, FILM COATED ORAL 2 TIMES DAILY
COMMUNITY

## 2020-12-24 RX ADMIN — SODIUM CHLORIDE 1000 ML: 9 INJECTION, SOLUTION INTRAVENOUS at 22:04

## 2020-12-24 RX ADMIN — IOPAMIDOL 100 ML: 612 INJECTION, SOLUTION INTRAVENOUS at 23:53

## 2020-12-24 RX ADMIN — MORPHINE SULFATE 4 MG: 4 INJECTION, SOLUTION INTRAMUSCULAR; INTRAVENOUS at 23:12

## 2020-12-24 ASSESSMENT — ENCOUNTER SYMPTOMS
EYE DISCHARGE: 0
COLOR CHANGE: 0
COUGH: 0
BACK PAIN: 0
CHEST TIGHTNESS: 0
SHORTNESS OF BREATH: 0
EYE REDNESS: 0
VOMITING: 0
DIARRHEA: 0
RHINORRHEA: 0
NAUSEA: 0
ABDOMINAL PAIN: 0
SINUS PAIN: 0

## 2020-12-24 ASSESSMENT — PAIN SCALES - GENERAL: PAINLEVEL_OUTOF10: 2

## 2020-12-25 ENCOUNTER — APPOINTMENT (OUTPATIENT)
Dept: ULTRASOUND IMAGING | Age: 70
DRG: 164 | End: 2020-12-25
Payer: MEDICARE

## 2020-12-25 PROBLEM — I26.99 PULMONARY EMBOLUS AND INFARCTION (HCC): Status: ACTIVE | Noted: 2020-12-25

## 2020-12-25 LAB
ANTI-XA UNFRAC HEPARIN: 0.5 IU/ML
ANTI-XA UNFRAC HEPARIN: 1.04 IU/ML
APTT: 24.4 SEC (ref 24.4–36.8)
APTT: >150 SEC (ref 24.4–36.8)
HCT VFR BLD CALC: 45.7 % (ref 42–52)
HCT VFR BLD CALC: 48.9 % (ref 42–52)
HEMOGLOBIN: 15.7 G/DL (ref 14–18)
HEMOGLOBIN: 16.5 G/DL (ref 14–18)
INR BLD: 1.1
INR BLD: 1.2
LV EF: 60 %
LVEF MODALITY: NORMAL
MCH RBC QN AUTO: 31.3 PG (ref 27–31.3)
MCH RBC QN AUTO: 31.8 PG (ref 27–31.3)
MCHC RBC AUTO-ENTMCNC: 33.7 % (ref 33–37)
MCHC RBC AUTO-ENTMCNC: 34.5 % (ref 33–37)
MCV RBC AUTO: 92.1 FL (ref 80–100)
MCV RBC AUTO: 92.9 FL (ref 80–100)
PDW BLD-RTO: 13.4 % (ref 11.5–14.5)
PDW BLD-RTO: 13.7 % (ref 11.5–14.5)
PLATELET # BLD: 124 K/UL (ref 130–400)
PLATELET # BLD: 127 K/UL (ref 130–400)
PROSTATE SPECIFIC ANTIGEN: 0.01 NG/ML (ref 0–6.22)
PROTHROMBIN TIME: 14.3 SEC (ref 12.3–14.9)
PROTHROMBIN TIME: 14.9 SEC (ref 12.3–14.9)
RBC # BLD: 4.96 M/UL (ref 4.7–6.1)
RBC # BLD: 5.26 M/UL (ref 4.7–6.1)
REASON FOR REJECTION: NORMAL
REJECTED TEST: NORMAL
SARS-COV-2, NAAT: NOT DETECTED
WBC # BLD: 7.2 K/UL (ref 4.8–10.8)
WBC # BLD: 8.2 K/UL (ref 4.8–10.8)

## 2020-12-25 PROCEDURE — 85610 PROTHROMBIN TIME: CPT

## 2020-12-25 PROCEDURE — 6370000000 HC RX 637 (ALT 250 FOR IP): Performed by: INTERNAL MEDICINE

## 2020-12-25 PROCEDURE — 93306 TTE W/DOPPLER COMPLETE: CPT

## 2020-12-25 PROCEDURE — 6360000002 HC RX W HCPCS: Performed by: PHYSICIAN ASSISTANT

## 2020-12-25 PROCEDURE — 2000000000 HC ICU R&B

## 2020-12-25 PROCEDURE — U0002 COVID-19 LAB TEST NON-CDC: HCPCS

## 2020-12-25 PROCEDURE — 6360000002 HC RX W HCPCS: Performed by: INTERNAL MEDICINE

## 2020-12-25 PROCEDURE — 85027 COMPLETE CBC AUTOMATED: CPT

## 2020-12-25 PROCEDURE — 85730 THROMBOPLASTIN TIME PARTIAL: CPT

## 2020-12-25 PROCEDURE — 84153 ASSAY OF PSA TOTAL: CPT

## 2020-12-25 PROCEDURE — 85520 HEPARIN ASSAY: CPT

## 2020-12-25 PROCEDURE — 36415 COLL VENOUS BLD VENIPUNCTURE: CPT

## 2020-12-25 PROCEDURE — 99223 1ST HOSP IP/OBS HIGH 75: CPT | Performed by: INTERNAL MEDICINE

## 2020-12-25 PROCEDURE — 93970 EXTREMITY STUDY: CPT

## 2020-12-25 RX ORDER — LANOLIN ALCOHOL/MO/W.PET/CERES
3 CREAM (GRAM) TOPICAL NIGHTLY PRN
Status: DISCONTINUED | OUTPATIENT
Start: 2020-12-25 | End: 2020-12-27 | Stop reason: HOSPADM

## 2020-12-25 RX ORDER — ASPIRIN 81 MG/1
81 TABLET ORAL DAILY
Status: DISCONTINUED | OUTPATIENT
Start: 2020-12-25 | End: 2020-12-27 | Stop reason: HOSPADM

## 2020-12-25 RX ORDER — HEPARIN SODIUM 1000 [USP'U]/ML
40 INJECTION, SOLUTION INTRAVENOUS; SUBCUTANEOUS PRN
Status: DISCONTINUED | OUTPATIENT
Start: 2020-12-25 | End: 2020-12-26

## 2020-12-25 RX ORDER — HEPARIN SODIUM 10000 [USP'U]/100ML
18 INJECTION, SOLUTION INTRAVENOUS CONTINUOUS
Status: DISCONTINUED | OUTPATIENT
Start: 2020-12-25 | End: 2020-12-26

## 2020-12-25 RX ORDER — LOVASTATIN 20 MG/1
80 TABLET ORAL NIGHTLY
Status: DISCONTINUED | OUTPATIENT
Start: 2020-12-25 | End: 2020-12-27 | Stop reason: HOSPADM

## 2020-12-25 RX ORDER — HEPARIN SODIUM 1000 [USP'U]/ML
80 INJECTION, SOLUTION INTRAVENOUS; SUBCUTANEOUS ONCE
Status: COMPLETED | OUTPATIENT
Start: 2020-12-25 | End: 2020-12-25

## 2020-12-25 RX ORDER — CARVEDILOL 3.12 MG/1
3.12 TABLET ORAL 2 TIMES DAILY WITH MEALS
Status: DISCONTINUED | OUTPATIENT
Start: 2020-12-25 | End: 2020-12-27 | Stop reason: HOSPADM

## 2020-12-25 RX ORDER — HEPARIN SODIUM 1000 [USP'U]/ML
80 INJECTION, SOLUTION INTRAVENOUS; SUBCUTANEOUS PRN
Status: DISCONTINUED | OUTPATIENT
Start: 2020-12-25 | End: 2020-12-25 | Stop reason: ALTCHOICE

## 2020-12-25 RX ORDER — HEPARIN SODIUM 10000 [USP'U]/100ML
18 INJECTION, SOLUTION INTRAVENOUS CONTINUOUS
Status: DISCONTINUED | OUTPATIENT
Start: 2020-12-25 | End: 2020-12-25 | Stop reason: ALTCHOICE

## 2020-12-25 RX ORDER — HEPARIN SODIUM 1000 [USP'U]/ML
80 INJECTION, SOLUTION INTRAVENOUS; SUBCUTANEOUS PRN
Status: DISCONTINUED | OUTPATIENT
Start: 2020-12-25 | End: 2020-12-26

## 2020-12-25 RX ORDER — DIPHENHYDRAMINE HCL 25 MG
50 TABLET ORAL ONCE
Status: DISCONTINUED | OUTPATIENT
Start: 2020-12-25 | End: 2020-12-27 | Stop reason: HOSPADM

## 2020-12-25 RX ORDER — NIACIN 500 MG/1
1000 TABLET, EXTENDED RELEASE ORAL NIGHTLY
Status: DISCONTINUED | OUTPATIENT
Start: 2020-12-25 | End: 2020-12-27 | Stop reason: HOSPADM

## 2020-12-25 RX ORDER — HEPARIN SODIUM 1000 [USP'U]/ML
40 INJECTION, SOLUTION INTRAVENOUS; SUBCUTANEOUS PRN
Status: DISCONTINUED | OUTPATIENT
Start: 2020-12-25 | End: 2020-12-25 | Stop reason: ALTCHOICE

## 2020-12-25 RX ORDER — HEPARIN SODIUM 1000 [USP'U]/ML
80 INJECTION, SOLUTION INTRAVENOUS; SUBCUTANEOUS ONCE
Status: DISCONTINUED | OUTPATIENT
Start: 2020-12-25 | End: 2020-12-25 | Stop reason: ALTCHOICE

## 2020-12-25 RX ADMIN — HEPARIN SODIUM 7080 UNITS: 1000 INJECTION INTRAVENOUS; SUBCUTANEOUS at 00:24

## 2020-12-25 RX ADMIN — CARVEDILOL 3.12 MG: 3.12 TABLET, FILM COATED ORAL at 17:21

## 2020-12-25 RX ADMIN — HEPARIN SODIUM AND DEXTROSE 18 UNITS/KG/HR: 10000; 5 INJECTION INTRAVENOUS at 00:25

## 2020-12-25 RX ADMIN — NIACIN 1000 MG: 500 TABLET, EXTENDED RELEASE ORAL at 22:25

## 2020-12-25 RX ADMIN — HEPARIN SODIUM AND DEXTROSE 15 UNITS/KG/HR: 10000; 5 INJECTION INTRAVENOUS at 17:45

## 2020-12-25 ASSESSMENT — PAIN SCALES - GENERAL
PAINLEVEL_OUTOF10: 0

## 2020-12-25 ASSESSMENT — ENCOUNTER SYMPTOMS
APNEA: 0
GASTROINTESTINAL NEGATIVE: 1
EYE REDNESS: 0
EYE DISCHARGE: 0
FACIAL SWELLING: 0
TROUBLE SWALLOWING: 0
CHOKING: 0
CHEST TIGHTNESS: 0
SHORTNESS OF BREATH: 1
STRIDOR: 0
COUGH: 0
WHEEZING: 0

## 2020-12-25 NOTE — ED TRIAGE NOTES
Pt had a syncopal episode after having a bowel movement. Family saw him pass out in his kitchen. Wife started chest compressions. He regained consciousness shortly after. Complained of chest pain to EMS. They gave him 1 nitro with some relief. C-collar in place. Removed by Suellyn Blizzard. Aprox 1 cm lac under right eye. Minimal bleeding controlled PTA without intervention.

## 2020-12-25 NOTE — PROGRESS NOTES
0800 Assessment complete, see flow sheet. Patient A/O X4, following all commands. Heparin drip cont as ordered. Repositioned for comfort. 0900 Heparin drip held for one hour per anti xa lab level per protocol. 0930 Patient had large brown BM on bedpan, patient cleansed. Depends applied per patient request. Repositioned for comfort. 1045 Seen by Dr Aga Contreras update given update given, see orders. 1430 Seen by Dr Liyah Walker complete update given, see orders. 1800 Patient remains chest pain free, resp even and unlabored, denies pain and or discomfort. No changes in assessment noted.

## 2020-12-25 NOTE — CARE COORDINATION
Met with patient. He states he would like an update from the RN about his ultrasound. Notified RN. Pt also states that his brother in law \"Jonathan\" came in his room and discussed his care. \"He works here and then he went to speak with his marybel, the doctor out there\". He states he \"will discuss with his wife because he doesn't want to start a family feud\". [facility] EMERGENCY [facility] Initial Discharge Assessment    Met with Patient to discuss discharge plan. PCP: Dorota Black MD                                Date of Last Visit: \"NOT LONG AGO\"    If no PCP, list provided? N/A    Discharge Planning    Living Arrangements: independently at home    Who do you live with? WIFE    Who helps you with your care:  self    If lives at home:     Do you have any barriers navigating in your home? no    Patient can perform ADL? Yes    Current Services (outpatient and in home) :  None    Dialysis: No    Is transportation available to get to your appointments? Yes    DME Equipment:  no    Respiratory equipment: None    Respiratory provider:  no     Pharmacy:  yes - [facility]    Consult with Medication Assistance Program?  No      [facility] Declined, UNLESS INDICATED    Patient agreeable to SNF/Rehab? Declined, UNLESS INDICATED    Other discharge needs identified? N/A    Freedom of choice list provided with basic dialogue that supports the patient's individualized plan of care/goals and shares the quality data associated with the providers. Yes    Does Patient Have a High-Risk for Readmission Diagnosis (CHF, PN, MI, COPD)? No    The plan for Transition of Care is related to the following treatment goals:FREE OF CLOTS, INDICATIONS BY PHYSICIAN    Initial Discharge Plan? (Note: please see concurrent daily documentation for any updates after initial note).     HOME W/WIFE    The Patient and/or patient representative: PATIENT was provided with choice of any post-acute providers for care and equipment and agrees with discharge plan  Yes    Electronically signed by Logan Sorto RN on 12/25/2020 at 3:12 PM

## 2020-12-25 NOTE — CONSULTS
neoplasm of prostate ([facility] [address]    Major laceration of right kidney    Intrinsic urethral sphincter deficiency    HTN (hypertension)    Hyperlipidemia    Insomnia    C. difficile diarrhea    Herniated lumbar disc without myelopathy    Pulmonary embolus and infarction [facility])       Past Surgical History:   Procedure Laterality Date    ABDOMEN SURGERY      large wound revision with mesh / due to non healing wound    CARDIAC SURGERY      CABG x 4 / aortic patch / 2 cardiac stents    COLONOSCOPY      HERNIA REPAIR      bilateral inguinal & umbilical hernia repairs    OTHER SURGICAL HISTORY      due to radical prostatectomy /artificial bladder sphincter    PATELLA FRACTURE SURGERY Right 1979    MA LAMINECTOMY,>2 SGMT,LUMBAR N/A 2/14/2018    EXTRAFORAMINAL L 3-4 MICRODISKECTOMY, RIGHT  RIGHT L 3-4 LAMINECTOMY performed by Kenia Jung MD at [address]    radical prostatectomy due to cancer / no trx to follow    TRACHEOTOMY  2011    placement with then closure       Social History     Socioeconomic History    Marital status:      Spouse name: None    Number of children: None    Years of education: None    Highest education level: None   Occupational History    None   Social Needs    Financial resource strain: None    Food insecurity     Worry: None     Inability: None    Transportation needs     Medical: None     Non-medical: None   Tobacco Use    Smoking status: Never Smoker    Smokeless tobacco: Never Used   Substance and Sexual Activity    Alcohol use: No    Drug use: No    Sexual activity: None   Lifestyle    Physical activity     Days per week: None     Minutes per session: None    Stress: None   Relationships    Social connections     Talks on phone: None     Gets together: None     Attends [employer] service: None     Active member of club or organization: None     Attends meetings of clubs or organizations: None     Relationship status: None    Intimate partner violence     Fear of current or ex partner: None     Emotionally abused: None     Physically abused: None     Forced sexual activity: None   Other Topics Concern    None   Social History Narrative    None       Family History   Problem Relation Age of Onset    Diabetes Mother     Heart Disease Father     High Cholesterol Sister     High Cholesterol Brother     Heart Disease Brother        Current Facility-Administered Medications   Medication Dose Route Frequency Provider Last Rate Last Admin    heparin (porcine) injection 7,080 Units  80 Units/kg Intravenous PRN [facility] Sedar, DO        heparin (porcine) injection 3,540 Units  40 Units/kg Intravenous PRN [facility] Sedar, DO        heparin 25,000 units in dextrose 5% 250 mL infusion  18 Units/kg/hr Intravenous Continuous Nicholas Terrell Sedar, DO   Stopped at 12/25/20 0900    melatonin tablet 3 mg  3 mg Oral Nightly [facility]        aspirin EC tablet 81 mg  81 mg Oral Daily [facility]   Stopped at 12/25/20 0920    carvedilol (COREG) tablet 3.125 mg  3.125 mg Oral BID WC [facility], DO   Stopped at 12/25/20 0920    lovastatin (MEVACOR) tablet 80 mg  80 mg Oral Nightly [facility] Sedar, DO        niacin (NIASPAN) extended release tablet 1,000 mg  1,000 mg Oral Nightly [facility]        predniSONE (DELTASONE) tablet 50 mg  50 mg Oral Once Tobin J Holiday, DO        diphenhydrAMINE (BENADRYL) tablet 50 mg  50 mg Oral Once Tobin J Holiday, DO        hydrocortisone sodium succinate PF (SOLU-CORTEF) injection 200 mg  200 mg Intravenous Once Tobin J Holiday, DO        morphine injection 4 mg  4 mg Intravenous Q15 Min PRN Ashia Pineda PA-C   4 mg at 12/24/20 2312       ALLERGIES: Statins    Review of Systems   Constitutional: Negative for activity change, appetite change, chills, diaphoresis, fatigue and fever. HENT: Negative for facial swelling, nosebleeds and trouble swallowing. Eyes: Negative for discharge, redness and visual disturbance. Respiratory: Positive for shortness of breath. Negative for apnea, cough, choking, chest tightness, wheezing and stridor. Cardiovascular: Positive for leg swelling. Gastrointestinal: Negative. Genitourinary: Negative for difficulty urinating, flank pain, frequency and hematuria. Musculoskeletal: Negative. Negative for neck pain. Skin: Negative. Neurological: Positive for syncope. Negative for dizziness, seizures, speech difficulty, weakness and light-headedness. Psychiatric/Behavioral: Negative for behavioral problems, confusion and sleep disturbance. The patient is not nervous/anxious. VITALS:  Blood pressure (!) 143/93, pulse 73, temperature 98.9 °F (37.2 °C), resp. rate 14, height 6' (1.829 m), weight 202 lb (91.6 kg), SpO2 100 %. Body mass index is 27.4 kg/m². Physical Exam   Constitutional: He is oriented to person, place, and time. He appears well-developed and well-nourished. HENT:   Head: Normocephalic and atraumatic. Eyes: Pupils are equal, round, and reactive to light. Conjunctivae and EOM are normal.   Neck: Normal carotid pulses, no hepatojugular reflux and no JVD present. Carotid bruit is not present. No tracheal deviation present. No thyromegaly present. Cardiovascular: Normal rate, regular rhythm, S1 normal, S2 normal, normal heart sounds and intact distal pulses. PMI is not displaced. Exam reveals no gallop, no S3, no S4 and no friction rub. No murmur heard. Pulses:       Carotid pulses are 3+ on the right side and 3+ on the left side. Radial pulses are 3+ on the right side and 3+ on the left side. Femoral pulses are 3+ on the right side and 3+ on the left side. Popliteal pulses are 3+ on the right side and 3+ on the left side. Dorsalis pedis pulses are 3+ on the right side and 3+ on the left side. Posterior tibial pulses are 3+ on the right side and 3+ on the left side.    Pulmonary/Chest: Effort normal and breath sounds Lymphocytes Absolute 1.8 1.0 - 4.8 K/uL    Monocytes Absolute 0.6 0.2 - 0.8 K/uL    Eosinophils Absolute 0.3 0.0 - 0.7 K/uL    Basophils Absolute 0.0 0.0 - 0.2 K/uL   Troponin    Collection Time: 12/24/20 10:00 PM   Result Value Ref Range    Troponin <0.010 0.000 - 0.010 ng/mL   CK    Collection Time: 12/24/20 10:00 PM   Result Value Ref Range    Total CK 89 0 - 190 U/L   D-Dimer, Quantitative    Collection Time: 12/24/20 10:00 PM   Result Value Ref Range    D-Dimer, Quant 14.11 (HH) 0.00 - 0.50 mg/L FEU   Magnesium    Collection Time: 12/24/20 10:00 PM   Result Value Ref Range    Magnesium 2.5 (H) 1.7 - 2.4 mg/dL   Brain Natriuretic Peptide    Collection Time: 12/24/20 10:00 PM   Result Value Ref Range    Pro- pg/mL   CBC    Collection Time: 12/25/20 12:15 AM   Result Value Ref Range    WBC 8.2 4.8 - 10.8 K/uL    RBC 4.96 4.70 - 6.10 M/uL    Hemoglobin 15.7 14.0 - 18.0 g/dL    Hematocrit 45.7 42.0 - 52.0 %    MCV 92.1 80.0 - 100.0 fL    MCH 31.8 (H) 27.0 - 31.3 pg    MCHC 34.5 33.0 - 37.0 %    RDW 13.4 11.5 - 14.5 %    Platelets 653 (L) 376 - 400 K/uL   APTT    Collection Time: 12/25/20 12:15 AM   Result Value Ref Range    aPTT 24.4 24.4 - 36.8 sec   Protime-INR    Collection Time: 12/25/20 12:15 AM   Result Value Ref Range    Protime 14.3 12.3 - 14.9 sec    INR 1.1    COVID-19    Collection Time: 12/25/20 12:45 AM   Result Value Ref Range    SARS-CoV-2, NAAT Not Detected Not Detected   HEPARIN LEVEL/ANTI-XA    Collection Time: 12/25/20  5:17 AM   Result Value Ref Range    Anti-XA Unfrac Heparin 1.04 IU/mL   CBC    Collection Time: 12/25/20  5:17 AM   Result Value Ref Range    WBC 7.2 4.8 - 10.8 K/uL    RBC 5.26 4.70 - 6.10 M/uL    Hemoglobin 16.5 14.0 - 18.0 g/dL    Hematocrit 48.9 42.0 - 52.0 %    MCV 92.9 80.0 - 100.0 fL    MCH 31.3 27.0 - 31.3 pg    MCHC 33.7 33.0 - 37.0 %    RDW 13.7 11.5 - 14.5 %    Platelets 175 (L) 421 - 400 K/uL   PSA SCREENING    Collection Time: 12/25/20  5:17 AM   Result Value Ref Range    PSA 0.01 0.00 - 6.22 ng/mL   SPECIMEN REJECTION    Collection Time: 12/25/20  7:23 AM   Result Value Ref Range    Rejected Test PT PTT     Reason for Rejection see below    Protime-INR    Collection Time: 12/25/20  7:41 AM   Result Value Ref Range    Protime 14.9 12.3 - 14.9 sec    INR 1.2    APTT    Collection Time: 12/25/20  7:41 AM   Result Value Ref Range    aPTT >150.0 (HH) 24.4 - 36.8 sec   HEPARIN LEVEL/ANTI-XA    Collection Time: 12/25/20  3:57 PM   Result Value Ref Range    Anti-XA Unfrac Heparin 0.50 IU/mL     Troponin:   Lab Results   Component Value Date    TROPONINI <0.010 12/24/2020       ASSESSMENT:  saddle pulmonary embolism with RV strain. Appears to be unprovoked  Recurrence left lower extremity DVT  Syncope secondary to PE  History of coronary disease status post CABG/PCI  Essential hypertension  Hyperlipidemia  History of remote prostate cancer        PLAN:   1. As always, aggressive risk factor modification is strongly recommended. We should adhere to the JNC VIII guidelines for HTN management and the NCEPATP III guidelines for LDL-C management. 2. Given patient's presentation of saddle pulmonary embolism associated with RV strain, syncope, significant shortness of breath with mild exertion and left lower extremity DVT he would benefit from pulmonary embolism thrombectomy. Discussed with pulmonary and agree with the plan. We will plan for tomorrow a.m.  3. We will also need IVC filter placement  4. Continue with IV heparin  5. Bed rest  6. Monitor H&H  7. Monitor on telemetry  8. GI prophylaxis    Thank you for allowing me to participate in the care of your patient, please don't hesitate to contact me if you have any further questions.     Electronically signed by Collette Dumas DO on 12/25/2020 at 5:23 PM

## 2020-12-25 NOTE — ED PROVIDER NOTES
[address] [facility] ED  EMERGENCY DEPARTMENT ENCOUNTER      Pt Name: Zaid Rock  MRN: [id number]  [place] 1950  Date of evaluation: 12/24/2020  Provider: Abram Moreno PA-C    CHIEF COMPLAINT       Chief Complaint   Patient presents with    Loss of Consciousness         HISTORY OF PRESENT ILLNESS   (Location/Symptom, Timing/Onset, Context/Setting, Quality, Duration, Modifying Factors, Severity)  Note limiting factors. Zaid Rock is a 72 y.o. male who presents to the emergency department status post syncopal episode. Patient states he was getting up from the toilet he became dizzy he attempted to walk into his kitchen rest when he lost consciousness. According to EMS patient's wife gave multiple chest compressions as the patient was unresponsive. Patient came to and is currently GCS 15, alert and oriented x3. Patient states that in route he had midsternal chest pain that he rated at a 2 out of 10. Patient described it as sudden onset, constant, achy, midsternal chest pain did not radiate. Patient was given 1 nitro and is currently pain-free. Patient denies any shortness of breath, nausea, vomiting. Patient denies cough. Patient denies of pain anywhere. Patient states that about 1 week ago he had a similar episode while walking outside. Patient states he walked about 300 feet became dizzy, short of breath. Patient states he then sat down then walked another about 300 feet to his barn were he was dizzy and almost fell however his brother caught him. Patient states he was short of breath at that time. HPI    Nursing Notes were reviewed. REVIEW OF SYSTEMS    (2-9 systems for level 4, 10 or more for level 5)     Review of Systems   Constitutional: Negative for activity change, chills, fatigue and fever. HENT: Negative for congestion, hearing loss, rhinorrhea, sinus pain, sneezing and tinnitus. Eyes: Negative for discharge, redness and visual disturbance.    Respiratory:  PROSTATE SURGERY  2000    radical prostatectomy due to cancer / no trx to follow    TRACHEOTOMY  2011    placement with then closure         CURRENT MEDICATIONS       Previous Medications    ACETAMINOPHEN (TYLENOL) 325 MG TABLET    Take 650 mg by mouth every 6 hours as needed for Pain    ASPIRIN 81 MG TABLET    Take 81 mg by mouth    CARVEDILOL (COREG) 3.125 MG TABLET    Take 3.125 mg by mouth    CLONAZEPAM (KLONOPIN) 2 MG TABLET    TAKE 1 TABLET BY MOUTH TWICE A DAY    COENZYME Q10 (COQ-10) 100 MG CAPS    Take by mouth daily    COLCHICINE (COLCRYS) 0.6 MG TABLET    Take 0.6 mg by mouth daily    EVOLOCUMAB (REPATHA) 140 MG/ML SOSY    Inject into the skin every 14 days    GABAPENTIN (NEURONTIN) 300 MG CAPSULE    Take 1 capsule by mouth nightly as needed (nerve pain) for up to 30 days. GABAPENTIN (NEURONTIN) 600 MG TABLET    Take 1 tablet by mouth 3 times daily as needed (nerve pain) for up to 30 days.     LOVASTATIN (MEVACOR) 40 MG TABLET    Take 80 mg by mouth    MULTIPLE VITAMIN (MULTI-VITAMINS) TABS    TAKE 1 TABLET BY MOUTH EVERY DAY    NIACIN (NIASPAN) 1000 MG EXTENDED RELEASE TABLET    Take 1,000 mg by mouth    RAMIPRIL (ALTACE) 10 MG CAPSULE    Take 10 mg by mouth    SACUBITRIL-VALSARTAN (ENTRESTO)  MG PER TABLET    Take by mouth    TEMAZEPAM (RESTORIL) 30 MG CAPSULE    TAKE ONE CAPSULE BY MOUTH AT BEDTIME AS NEEDED       ALLERGIES     Statins    FAMILY HISTORY       Family History   Problem Relation Age of Onset    Diabetes Mother     Heart Disease Father     High Cholesterol Sister     High Cholesterol Brother     Heart Disease Brother           SOCIAL HISTORY       Social History     Socioeconomic History    Marital status:      Spouse name: None    Number of children: None    Years of education: None    Highest education level: None   Occupational History    None   Social Needs    Financial resource strain: None    Food insecurity     Worry: None     Inability: None    Pulses: Normal pulses. Heart sounds: Normal heart sounds. Pulmonary:      Effort: Pulmonary effort is normal.      Breath sounds: Normal breath sounds. Abdominal:      General: Abdomen is flat. Bowel sounds are normal. There is no distension. Palpations: Abdomen is soft. Tenderness: There is no abdominal tenderness. Musculoskeletal: Normal range of motion. General: No swelling, tenderness or signs of injury. Skin:     General: Skin is warm and dry. Capillary Refill: Capillary refill takes less than 2 seconds. Findings: No erythema. Neurological:      General: No focal deficit present. Mental Status: He is alert and oriented to person, place, and time. Psychiatric:         Mood and Affect: Mood normal.         DIAGNOSTIC RESULTS     EKG: All EKG's are interpreted by the Emergency Department Physician who either signs or Co-signs this chart in the absence of a cardiologist.    Normal sinus rhythm with a ventricular rate of 89 bpm.  No ST segment elevation or depression. No sign of ischemia. Possible axis deviation. Inverted T waves in V1 and V2 but not V3, V4, V5, V6, 2, 3 or aVF. QTC of 450 ms. Abnormal EKG. RADIOLOGY:   Non-plain film images such as CT, Ultrasound and MRI are read by the radiologist. Plain radiographic images are visualized and preliminarily interpreted by the emergency physician with the below findings:  No acute cardiopulmonary process.     Interpretation per the Radiologist below, if available at the time of this note:    XR CHEST PORTABLE    (Results Pending)   CT Head WO Contrast    (Results Pending)   CT CERVICAL SPINE WO CONTRAST    (Results Pending)   CT FACIAL BONES WO CONTRAST    (Results Pending)   CTA Chest W WO  (PE study)    (Results Pending)   US DUP LOWER EXTREMITY LEFT CHRISTOPHER    (Results Pending)   US DUP LOWER EXTREMITY RIGHT CHRISTOPHER    (Results Pending)         ED BEDSIDE ULTRASOUND:   Performed by ED Physician - reassessed. MDM      REASSESSMENT      On reassessment patient remains hemodynamically stable. Not requiring any supplemental O2. Troponin, CK, BNP were all negative. However patient's D-dimer was elevated to over 14. CT PE was then obtained which showed bilateral saddle PE with some right ventricular strain. Dr. Tra Mendoza was consulted who stated to place the patient on heparin drip and obtain bilateral lower extremity DVT studies. Dr. Lisa Patel was then consulted who agreed to admit the patient. Patient remained hemodynamically stable throughout his entire ED course and was admitted to the ICU in serious but stable condition. CRITICAL CARE TIME   Total Critical Care time was 120 minutes, excluding separately reportable procedures. There was a high probability of clinically significant/life threatening deterioration in the patient's condition which required my urgent intervention. CONSULTS:  None    PROCEDURES:  Unless otherwise noted below, none     Procedures        FINAL IMPRESSION      1. Pulmonary embolus and infarction ([facility] [address]    2. Syncope and collapse          DISPOSITION/PLAN   DISPOSITION Decision To Admit 12/25/2020 12:54:46 AM      PATIENT REFERRED TO:  No follow-up provider specified. DISCHARGE MEDICATIONS:  New Prescriptions    No medications on file     Controlled Substances Monitoring:     RX Monitoring 1/29/2018   Attestation The Prescription Monitoring Report for this patient was reviewed today.        (Please note that portions of this note were completed with a voice recognition program.  Efforts were made to edit the dictations but occasionally words are mis-transcribed.)    Jason Burroughs PA-C (electronically signed)             Jason Burroughs PA-C  12/25/20 [id number]

## 2020-12-25 NOTE — PROGRESS NOTES
Physician Progress Note    2020   10:41 AM    Name:  Ken Castillo  MRN:    [id number]     IP Day: 0     Admit Date: 2020  9:40 PM  PCP: Venkatesh Little MD    Code Status:  Full Code      Assessment and Plan: Active Problems/ diagnosis:     Submassive PE  Left lower ext DVT   H/o CAD- no chest pain   HLD  H/o prostate Ca      Plan  - resume heparin infusion  - will discuss with Dr Luís Oro if thrombectomy is still needed today  - monitor BP   - resume current meds  - restart diet if no procedure needed     DVT PPx     Subjective:      no new events. No chest pain. No dyspnea.      Physical Examination:      Vitals:  /61   Pulse 85   Temp 98.6 °F (37 °C) (Oral)   Resp 13   Ht 6' (1.829 m)   Wt 202 lb (91.6 kg)   SpO2 96%   BMI 27.40 kg/m²   Temp (24hrs), Av.6 °F (37 °C), Min:98.6 °F (37 °C), Max:98.6 °F (37 °C)      General appearance: alert, cooperative and no distress  Mental Status: oriented to person, place and time and normal affect  Lungs: clear to auscultation bilaterally, normal effort  Heart: regular rate and rhythm, no murmur  Abdomen: soft, nontender, nondistended, bowel sounds present, no masses  Extremities: no edema, redness, tenderness in the calves  Skin: no gross lesions, rashes    Data:     Labs:  Recent Labs     20  0015 20  0517   WBC 8.2 7.2   HGB 15.7 16.5   * 124*     Recent Labs     20  2200      K 4.7      CO2 23   BUN 30*   CREATININE 1.47*   GLUCOSE 106*     Recent Labs     20  2200   AST 21   ALT 13   BILITOT <0.2   ALKPHOS 55       Current Facility-Administered Medications   Medication Dose Route Frequency Provider Last Rate Last Admin    heparin (porcine) injection 7,080 Units  80 Units/kg Intravenous PRN Saritha Alexander Sedar, DO        heparin (porcine) injection 3,540 Units  40 Units/kg Intravenous PRN Jermaine BIRMINGHAM Sedar, DO        heparin 25,000 units in dextrose 5% 250 mL infusion  18 Units/kg/hr Intravenous Continuous Nataly Major Sedar, DO   Stopped at 12/25/20 0900    melatonin tablet 3 mg  3 mg Oral Nightly PRN Nataly Heady Sedar, DO        aspirin EC tablet 81 mg  81 mg Oral Daily Nataly Heady Sedar, DO   Stopped at 12/25/20 0920    carvedilol (COREG) tablet 3.125 mg  3.125 mg Oral BID WC Nataly Heady Sedar, DO   Stopped at 12/25/20 0920    lovastatin (MEVACOR) tablet 80 mg  80 mg Oral Nightly Nataly Heady Sedar, DO        niacin (NIASPAN) extended release tablet 1,000 mg  1,000 mg Oral Nightly Nataly Heady Sedar, DO        morphine injection 4 mg  4 mg Intravenous Q15 Min PRN Vani Salinas PA-C   4 mg at 12/24/20 2312       Additional work up or/and treatment plan may be added today or then after based on clinical progression. I am managing a portion of pt care. Some medical issues are handled by other specialists. Additional work up and treatment should be done in out pt setting by pt PCP and other out pt providers. In addition to examining and evaluating pt, I spent additional time explaining care, normaland abnormal findings, and treatment plan. All of pt questions were answered. Counseling, diet and education were provided. Case will be discussed with nursing staff when appropriate. Family will be updated if and when appropriate.        Electronically signed by Stacy Niño DO on 12/25/2020 at 10:41 AM

## 2020-12-25 NOTE — H&P
cardiac stents       Past Surgical History:      Procedure Laterality Date    ABDOMEN SURGERY      large wound revision with mesh / due to non healing wound    CARDIAC SURGERY      CABG x 4 / aortic patch / 2 cardiac stents    COLONOSCOPY      HERNIA REPAIR      bilateral inguinal & umbilical hernia repairs    OTHER SURGICAL HISTORY      due to radical prostatectomy /artificial bladder sphincter    PATELLA FRACTURE SURGERY Right 1979    ND LAMINECTOMY,>2 SGMT,LUMBAR N/A 2/14/2018    EXTRAFORAMINAL L 3-4 MICRODISKECTOMY, RIGHT  RIGHT L 3-4 LAMINECTOMY performed by Mariella Ballesteros MD at [address]    radical prostatectomy due to cancer / no trx to follow   [address] [facility]  2011    placement with then closure       Medications Prior to Admission:    Prior to Admission medications    Medication Sig Start Date End Date Taking?  Authorizing Provider   sacubitril-valsartan (ENTRESTO)  MG per tablet Take by mouth   Yes Historical Provider, MD   colchicine (COLCRYS) 0.6 MG tablet Take 0.6 mg by mouth daily   Yes Historical Provider, MD   Coenzyme Q10 (COQ-10) 100 MG CAPS Take by mouth daily   Yes Historical Provider, MD   Evolocumab (REPATHA) 140 MG/ML SOSY Inject into the skin every 14 days   Yes Historical Provider, MD   acetaminophen (TYLENOL) 325 MG tablet Take 650 mg by mouth every 6 hours as needed for Pain   Yes Historical Provider, MD   aspirin 81 MG tablet Take 81 mg by mouth   Yes Historical Provider, MD   carvedilol (COREG) 3.125 MG tablet Take 3.125 mg by mouth   Yes Historical Provider, MD   clonazePAM (KLONOPIN) 2 MG tablet TAKE 1 TABLET BY MOUTH TWICE A DAY 11/25/17  Yes Historical Provider, MD   Multiple Vitamin (MULTI-VITAMINS) TABS TAKE 1 TABLET BY MOUTH EVERY DAY 1/3/18  Yes Historical Provider, MD   lovastatin (MEVACOR) 40 MG tablet Take 80 mg by mouth   Yes Historical Provider, MD   niacin (NIASPAN) 1000 MG extended release tablet Take 1,000 mg by mouth   Yes Historical Provider, MD   ramipril (ALTACE) 10 MG capsule Take 10 mg by mouth 7/29/16  Yes Historical Provider, MD   temazepam (RESTORIL) 30 MG capsule TAKE ONE CAPSULE BY MOUTH AT BEDTIME AS NEEDED 11/25/17  Yes Historical Provider, MD   gabapentin (NEURONTIN) 600 MG tablet Take 1 tablet by mouth 3 times daily as needed (nerve pain) for up to 30 days. 2/2/18 3/4/18  Ray Nicolas DO   gabapentin (NEURONTIN) 300 MG capsule Take 1 capsule by mouth nightly as needed (nerve pain) for up to 30 days. 1/9/18 2/8/18  Ray Nicolas, DO       Allergies:  Statins    Social History:   TOBACCO:   reports that he has never smoked. He has never used smokeless tobacco.  ETOH:   reports no history of alcohol use. OCCUPATION:  farmer    Family History:       Problem Relation Age of Onset    Diabetes Mother     Heart Disease Father     High Cholesterol Sister     High Cholesterol Brother     Heart Disease Brother        REVIEW OF SYSTEMS:  Ten systems reviewed and negative except for as above. Physical Exam:    Vitals: /84   Pulse 85   Temp 98.6 °F (37 °C) (Oral)   Resp 14   Ht 6' (1.829 m)   Wt 202 lb (91.6 kg)   SpO2 98%   BMI 27.40 kg/m²   Constitutional: alert, appears stated age and cooperative  Skin: Skin color, texture, turgor normal. No rashes or lesions  Eyes:Eye: Normal external eye, conjunctiva, WILIAN. ENT: Head: Normocephalic, no lesions, without obvious abnormality. Neck: no adenopathy, no carotid bruit, no JVD, supple, symmetrical, trachea midline and thyroid not enlarged, symmetric, no tenderness/mass/nodules  Respiratory: clear to auscultation bilaterally no wheezes rales or rhonchi  Cardiovascular: regular rate and rhythm, well-healed midsternal incision no murmurs  Gastrointestinal: soft, non-tender; bowel sounds normal; no masses,  no organomegaly  Genitourinary: Deferred  Musculoskeletal:extremities normal, atraumatic, no cyanosis or edema  Neurologic: Mental status AAOx3 No facial asymmetry or droop. Normal muscle strength b/l. Psychiatric: Appropriate mood and affect. Good insight and judgement  Hematologic: No obvious bruising or bleeding    Recent Labs     12/24/20 2200 12/25/20  0015   WBC 7.1 8.2   HGB 15.9 15.7    127*     Recent Labs     12/24/20 2200      K 4.7      CO2 23   BUN 30*   CREATININE 1.47*   GLUCOSE 106*   AST 21   ALT 13   BILITOT <0.2   ALKPHOS 55     Troponin T:   Recent Labs     12/24/20 2200   TROPONINI <0.010       Assessment and Plan   1. Submassive saddle pulmonary embolus with right heart strain: Continue heparin infusion. Thrombectomy. Resume Eliquis post procedure. Discussed with cardiology possible coagulopathy work-up if indicated, however this would not change other plan as patient will require lifelong anticoagulation given his third VTE. Left lower extremity ultrasound to evaluate for any remaining clot  2. Coronary disease status post stenting and CABG: Resume antiplatelet agents continue anticoagulation. Follow cardiac enzymes and resume statin. 3. Hyperlipidemia: Aspirin statin  4. History of prostate cancer: Given recurrent VTE will check PSA level  5. DVT prophylaxis indicated on heparin infusion    Approximately 30 minutes critical care time directly providing patient care not including any procedural intervention.     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 difficile diarrhea A04.72    Herniated lumbar disc without myelopathy M51.26    Pulmonary embolus and infarction ([address] I26.99       Aunja Ritchie DO [place]  Admitting Hospitalist    Emergency Contact:

## 2020-12-26 LAB
ANION GAP SERPL CALCULATED.3IONS-SCNC: 10 MEQ/L (ref 9–15)
ANTI-XA UNFRAC HEPARIN: 0.37 IU/ML
ANTI-XA UNFRAC HEPARIN: 0.51 IU/ML
BASOPHILS ABSOLUTE: 0 K/UL (ref 0–0.2)
BASOPHILS RELATIVE PERCENT: 0.4 %
BUN BLDV-MCNC: 17 MG/DL (ref 8–23)
CALCIUM SERPL-MCNC: 8.5 MG/DL (ref 8.5–9.9)
CHLORIDE BLD-SCNC: 109 MEQ/L (ref 95–107)
CO2: 23 MEQ/L (ref 20–31)
CREAT SERPL-MCNC: 1.44 MG/DL (ref 0.7–1.2)
EOSINOPHILS ABSOLUTE: 0.2 K/UL (ref 0–0.7)
EOSINOPHILS RELATIVE PERCENT: 2.5 %
GFR AFRICAN AMERICAN: 58.8
GFR NON-AFRICAN AMERICAN: 48.6
GLUCOSE BLD-MCNC: 92 MG/DL (ref 70–99)
HCT VFR BLD CALC: 46.6 % (ref 42–52)
HEMOGLOBIN: 15.7 G/DL (ref 14–18)
INR BLD: 1.2
LYMPHOCYTES ABSOLUTE: 1.5 K/UL (ref 1–4.8)
LYMPHOCYTES RELATIVE PERCENT: 21 %
MCH RBC QN AUTO: 31.6 PG (ref 27–31.3)
MCHC RBC AUTO-ENTMCNC: 33.7 % (ref 33–37)
MCV RBC AUTO: 93.9 FL (ref 80–100)
MONOCYTES ABSOLUTE: 0.7 K/UL (ref 0.2–0.8)
MONOCYTES RELATIVE PERCENT: 9.7 %
NEUTROPHILS ABSOLUTE: 4.7 K/UL (ref 1.4–6.5)
NEUTROPHILS RELATIVE PERCENT: 66.4 %
PDW BLD-RTO: 13.6 % (ref 11.5–14.5)
PLATELET # BLD: 113 K/UL (ref 130–400)
POTASSIUM REFLEX MAGNESIUM: 4.6 MEQ/L (ref 3.4–4.9)
PROTHROMBIN TIME: 15.4 SEC (ref 12.3–14.9)
RBC # BLD: 4.96 M/UL (ref 4.7–6.1)
SODIUM BLD-SCNC: 142 MEQ/L (ref 135–144)
WBC # BLD: 7 K/UL (ref 4.8–10.8)

## 2020-12-26 PROCEDURE — 6360000002 HC RX W HCPCS

## 2020-12-26 PROCEDURE — 99222 1ST HOSP IP/OBS MODERATE 55: CPT | Performed by: INTERNAL MEDICINE

## 2020-12-26 PROCEDURE — C1751 CATH, INF, PER/CENT/MIDLINE: HCPCS

## 2020-12-26 PROCEDURE — 36014 PLACE CATHETER IN ARTERY: CPT | Performed by: INTERNAL MEDICINE

## 2020-12-26 PROCEDURE — 06H03DZ INSERTION OF INTRALUMINAL DEVICE INTO INFERIOR VENA CAVA, PERCUTANEOUS APPROACH: ICD-10-PCS | Performed by: INTERNAL MEDICINE

## 2020-12-26 PROCEDURE — 6360000004 HC RX CONTRAST MEDICATION: Performed by: INTERNAL MEDICINE

## 2020-12-26 PROCEDURE — 6370000000 HC RX 637 (ALT 250 FOR IP): Performed by: INTERNAL MEDICINE

## 2020-12-26 PROCEDURE — C1757 CATH, THROMBECTOMY/EMBOLECT: HCPCS

## 2020-12-26 PROCEDURE — 36415 COLL VENOUS BLD VENIPUNCTURE: CPT

## 2020-12-26 PROCEDURE — 2709999900 HC NON-CHARGEABLE SUPPLY

## 2020-12-26 PROCEDURE — 2580000003 HC RX 258: Performed by: INTERNAL MEDICINE

## 2020-12-26 PROCEDURE — 85610 PROTHROMBIN TIME: CPT

## 2020-12-26 PROCEDURE — 85025 COMPLETE CBC W/AUTO DIFF WBC: CPT

## 2020-12-26 PROCEDURE — 85520 HEPARIN ASSAY: CPT

## 2020-12-26 PROCEDURE — C1769 GUIDE WIRE: HCPCS

## 2020-12-26 PROCEDURE — 37184 PRIM ART M-THRMBC 1ST VSL: CPT | Performed by: INTERNAL MEDICINE

## 2020-12-26 PROCEDURE — B31S1ZZ FLUOROSCOPY OF RIGHT PULMONARY ARTERY USING LOW OSMOLAR CONTRAST: ICD-10-PCS | Performed by: INTERNAL MEDICINE

## 2020-12-26 PROCEDURE — 2000000000 HC ICU R&B

## 2020-12-26 PROCEDURE — 37191 INS ENDOVAS VENA CAVA FILTR: CPT | Performed by: INTERNAL MEDICINE

## 2020-12-26 PROCEDURE — 02CQ3ZZ EXTIRPATION OF MATTER FROM RIGHT PULMONARY ARTERY, PERCUTANEOUS APPROACH: ICD-10-PCS | Performed by: INTERNAL MEDICINE

## 2020-12-26 PROCEDURE — 2580000003 HC RX 258

## 2020-12-26 PROCEDURE — 85347 COAGULATION TIME ACTIVATED: CPT

## 2020-12-26 PROCEDURE — 80048 BASIC METABOLIC PNL TOTAL CA: CPT

## 2020-12-26 PROCEDURE — C1894 INTRO/SHEATH, NON-LASER: HCPCS

## 2020-12-26 PROCEDURE — C1880 VENA CAVA FILTER: HCPCS

## 2020-12-26 PROCEDURE — B31T1ZZ FLUOROSCOPY OF LEFT PULMONARY ARTERY USING LOW OSMOLAR CONTRAST: ICD-10-PCS | Performed by: INTERNAL MEDICINE

## 2020-12-26 PROCEDURE — 2500000003 HC RX 250 WO HCPCS

## 2020-12-26 RX ORDER — SODIUM CHLORIDE 9 MG/ML
INJECTION, SOLUTION INTRAVENOUS CONTINUOUS
Status: DISPENSED | OUTPATIENT
Start: 2020-12-26 | End: 2020-12-27

## 2020-12-26 RX ORDER — TRAZODONE HYDROCHLORIDE 50 MG/1
50 TABLET ORAL NIGHTLY
Status: DISCONTINUED | OUTPATIENT
Start: 2020-12-26 | End: 2020-12-27 | Stop reason: HOSPADM

## 2020-12-26 RX ADMIN — ASPIRIN 81 MG: 81 TABLET, COATED ORAL at 08:29

## 2020-12-26 RX ADMIN — CARVEDILOL 3.12 MG: 3.12 TABLET, FILM COATED ORAL at 15:37

## 2020-12-26 RX ADMIN — TRAZODONE HYDROCHLORIDE 50 MG: 50 TABLET ORAL at 21:33

## 2020-12-26 RX ADMIN — SODIUM CHLORIDE: 9 INJECTION, SOLUTION INTRAVENOUS at 15:43

## 2020-12-26 RX ADMIN — CARVEDILOL 3.12 MG: 3.12 TABLET, FILM COATED ORAL at 08:29

## 2020-12-26 RX ADMIN — RIVAROXABAN 15 MG: 15 TABLET, FILM COATED ORAL at 15:37

## 2020-12-26 RX ADMIN — NIACIN 1000 MG: 500 TABLET, EXTENDED RELEASE ORAL at 21:32

## 2020-12-26 RX ADMIN — IOPAMIDOL 160 ML: 612 INJECTION, SOLUTION INTRAVENOUS at 12:43

## 2020-12-26 ASSESSMENT — PAIN SCALES - GENERAL
PAINLEVEL_OUTOF10: 0
PAINLEVEL_OUTOF10: 0

## 2020-12-26 NOTE — BRIEF OP NOTE
Section of Cardiology  Adult Brief  Procedure Note        Procedure(s):  B/l pulmonary angio, right pulmonary artery thrombectomy  IVCF    Pre-operative Diagnosis:      PE, DVT    H&P Status: Completed and reviewed. Post-operative Diagnosis:    Large PE in right main PE, TA. Mod left distal PA PE  IVCF angio: normal     Findings:  See full report    Complications:  none    Primary Proceduralist:   Dr. Ngo Bound IVC in future.        Full procedure note to follow

## 2020-12-26 NOTE — PROGRESS NOTES
 melatonin tablet 3 mg  3 mg Oral Nightly PRN Jazlyn [place] Sedar, DO        aspirin EC tablet 81 mg  81 mg Oral Daily Jazlyn Spry Sedar, DO   81 mg at 12/26/20 3955    carvedilol (COREG) tablet 3.125 mg  3.125 mg Oral BID WC Uriah D Sedar, DO   3.125 mg at 12/26/20 5172    lovastatin (MEVACOR) tablet 80 mg  80 mg Oral Nightly Uriah D Sedar, DO        niacin (NIASPAN) extended release tablet 1,000 mg  1,000 mg Oral Nightly Jazlyn [place] Sedar, DO   1,000 mg at 12/25/20 2225    predniSONE (DELTASONE) tablet 50 mg  50 mg Oral Once Tobin J Holiday, DO        diphenhydrAMINE (BENADRYL) tablet 50 mg  50 mg Oral Once Tobin J Holiday, DO        hydrocortisone sodium succinate PF (SOLU-CORTEF) injection 200 mg  200 mg Intravenous Once Tobin J Holiday, DO        morphine injection 4 mg  4 mg Intravenous Q15 Min PRN Torrey Bojorquez PA-C   4 mg at 12/24/20 2312       Additional work up or/and treatment plan may be added today or then after based on clinical progression. I am managing a portion of pt care. Some medical issues are handled by other specialists. Additional work up and treatment should be done in out pt setting by pt PCP and other out pt providers. In addition to examining and evaluating pt, I spent additional time explaining care, normaland abnormal findings, and treatment plan. All of pt questions were answered. Counseling, diet and education were provided. Case will be discussed with nursing staff when appropriate. Family will be updated if and when appropriate.        Electronically signed by Bertrand Hayes DO on 12/26/2020 at 2:31 PM

## 2020-12-26 NOTE — FLOWSHEET NOTE
Dr. Byrd Shone called this morning, states that thrombectomy will be at 9am this morning, patients wife informed. Patient is sitting in bed, on room air with no shortness of breath noted. 97%.

## 2020-12-27 VITALS
TEMPERATURE: 97.8 F | SYSTOLIC BLOOD PRESSURE: 123 MMHG | WEIGHT: 210.32 LBS | RESPIRATION RATE: 15 BRPM | DIASTOLIC BLOOD PRESSURE: 59 MMHG | HEIGHT: 72 IN | BODY MASS INDEX: 28.49 KG/M2 | OXYGEN SATURATION: 98 % | HEART RATE: 92 BPM

## 2020-12-27 LAB
ANION GAP SERPL CALCULATED.3IONS-SCNC: 11 MEQ/L (ref 9–15)
BASOPHILS ABSOLUTE: 0 K/UL (ref 0–0.2)
BASOPHILS RELATIVE PERCENT: 0.5 %
BUN BLDV-MCNC: 18 MG/DL (ref 8–23)
CALCIUM SERPL-MCNC: 7.9 MG/DL (ref 8.5–9.9)
CHLORIDE BLD-SCNC: 108 MEQ/L (ref 95–107)
CO2: 21 MEQ/L (ref 20–31)
CREAT SERPL-MCNC: 1.45 MG/DL (ref 0.7–1.2)
EOSINOPHILS ABSOLUTE: 0.3 K/UL (ref 0–0.7)
EOSINOPHILS RELATIVE PERCENT: 4.6 %
GFR AFRICAN AMERICAN: 58.3
GFR NON-AFRICAN AMERICAN: 48.2
GLUCOSE BLD-MCNC: 92 MG/DL (ref 70–99)
HCT VFR BLD CALC: 39.4 % (ref 42–52)
HEMOGLOBIN: 13.1 G/DL (ref 14–18)
LYMPHOCYTES ABSOLUTE: 1.2 K/UL (ref 1–4.8)
LYMPHOCYTES RELATIVE PERCENT: 19.3 %
MCH RBC QN AUTO: 31 PG (ref 27–31.3)
MCHC RBC AUTO-ENTMCNC: 33.4 % (ref 33–37)
MCV RBC AUTO: 92.8 FL (ref 80–100)
MONOCYTES ABSOLUTE: 0.7 K/UL (ref 0.2–0.8)
MONOCYTES RELATIVE PERCENT: 10.4 %
NEUTROPHILS ABSOLUTE: 4.1 K/UL (ref 1.4–6.5)
NEUTROPHILS RELATIVE PERCENT: 65.2 %
PDW BLD-RTO: 13.7 % (ref 11.5–14.5)
PLATELET # BLD: 109 K/UL (ref 130–400)
POTASSIUM SERPL-SCNC: 4.4 MEQ/L (ref 3.4–4.9)
RBC # BLD: 4.25 M/UL (ref 4.7–6.1)
SODIUM BLD-SCNC: 140 MEQ/L (ref 135–144)
WBC # BLD: 6.3 K/UL (ref 4.8–10.8)

## 2020-12-27 PROCEDURE — 85025 COMPLETE CBC W/AUTO DIFF WBC: CPT

## 2020-12-27 PROCEDURE — 6370000000 HC RX 637 (ALT 250 FOR IP): Performed by: INTERNAL MEDICINE

## 2020-12-27 PROCEDURE — 99233 SBSQ HOSP IP/OBS HIGH 50: CPT | Performed by: INTERNAL MEDICINE

## 2020-12-27 PROCEDURE — 99232 SBSQ HOSP IP/OBS MODERATE 35: CPT | Performed by: INTERNAL MEDICINE

## 2020-12-27 PROCEDURE — 6370000000 HC RX 637 (ALT 250 FOR IP): Performed by: NURSE PRACTITIONER

## 2020-12-27 PROCEDURE — 36415 COLL VENOUS BLD VENIPUNCTURE: CPT

## 2020-12-27 PROCEDURE — 80048 BASIC METABOLIC PNL TOTAL CA: CPT

## 2020-12-27 RX ORDER — ACETAMINOPHEN 325 MG/1
650 TABLET ORAL EVERY 4 HOURS PRN
Status: DISCONTINUED | OUTPATIENT
Start: 2020-12-27 | End: 2020-12-27 | Stop reason: HOSPADM

## 2020-12-27 RX ADMIN — CARVEDILOL 3.12 MG: 3.12 TABLET, FILM COATED ORAL at 08:59

## 2020-12-27 RX ADMIN — RIVAROXABAN 15 MG: 15 TABLET, FILM COATED ORAL at 08:59

## 2020-12-27 RX ADMIN — ASPIRIN 81 MG: 81 TABLET, COATED ORAL at 08:59

## 2020-12-27 RX ADMIN — ACETAMINOPHEN 650 MG: 325 TABLET ORAL at 00:53

## 2020-12-27 ASSESSMENT — PAIN SCALES - GENERAL: PAINLEVEL_OUTOF10: 5

## 2020-12-27 NOTE — FLOWSHEET NOTE
Patient wife here to  patient. Phone, phone , glasses, glasses case and clothing returned home with patient. Patient educated on xarelto with a print out, he verbalized understanding. Right groin skin color, soft to palpate, pain denied.

## 2020-12-27 NOTE — PROGRESS NOTES
INPATIENT PROGRESS NOTES    PATIENT NAME: Aniceto Cohen  MRN: [id number]  SERVICE DATE:  December 27, 2020   SERVICE TIME:  12:31 PM      PRIMARY SERVICE: Pulmonary Disease    CHIEF COMPLAIN: Pulmonary embolism status post thrombectomy      INTERVAL HPI: Patient seen and examined at bedside, Interval Notes, orders reviewed. Nursing notes noted  Patient was seen earlier. He is doing well. He wants to go home. He has no complaint of shortness of breath. No chest pain. On room air O2 saturation 98%. Dr. Tobin Guillen came to see the patient. He is on Xarelto and Dr. Tobin Guillen will manage anticoagulation therapy. OBJECTIVE    Body mass index is 28.52 kg/m². PHYSICAL EXAM:  Vitals:  BP (!) 123/59   Pulse 92   Temp 97.8 °F (36.6 °C) (Oral)   Resp 15   Ht 6' (1.829 m)   Wt 210 lb 5.1 oz (95.4 kg)   SpO2 98%   BMI 28.52 kg/m²   General: Alert, awake . comfortable in bed, No distress. Head: Atraumatic , Normocephalic   Eyes: PERRL. No sclera icterus. No conjunctival injection. No discharge   ENT: No nasal  discharge. Pharynx clear. lips, teeth, mucosa and gums are normal, tongue protrudes in the midline  Neck:  Trachea midline. No thyromegaly, no JVD, No cervical adenopathy. Chest : Bilaterally symmetrical ,Normal effort,  No accessory muscle use  Lung : . Fair BS bilateral, decreased BS at bases. No Rales. No wheezing. No rhonchi. Heart[de-identified] Normal  rate. Regular rhythm. No mumur ,  Rub or gallop  ABD: Non-tender. Non-distended. No masses. No organmegaly. Normal bowel sounds. No hernia.   Ext : No Pitting both leg , No Cyanosis No clubbing  Neuro: no focal weakness          DATA:   Recent Labs     12/26/20  0535 12/27/20  0517   WBC 7.0 6.3   HGB 15.7 13.1*   HCT 46.6 39.4*   MCV 93.9 92.8   * 109*     Recent Labs     12/24/20  2200 12/26/20  0535 12/27/20  0517    142 140   K 4.7 4.6 4.4    109* 108*   CO2 23 23 21   BUN 30* 17 18   CREATININE 1.47* 1.44* 1.45*   GLUCOSE 106* 92 92 CALCIUM 8.6 8.5 7.9*   PROT 6.8  --   --    LABALBU 3.9  --   --    BILITOT <0.2  --   --    ALKPHOS 55  --   --    AST 21  --   --    ALT 13  --   --    LABGLOM 47.4* 48.6* 48.2*   GFRAA 57.4* 58.8* 58.3*   GLOB 2.9  --   --        MV Settings:          No results for input(s): PHART, HKS3ABI, PO2ART, YCY5USG, BEART, L6AMWVWG in the last 72 hours. O2 Device: None (Room air)    DIET CARDIAC;     MEDICATIONS during current hospitalization:    Continuous Infusions:    Scheduled Meds:   rivaroxaban  15 mg Oral BID WC    traZODone  50 mg Oral Nightly    aspirin  81 mg Oral Daily    carvedilol  3.125 mg Oral BID WC    lovastatin  80 mg Oral Nightly    niacin  1,000 mg Oral Nightly    predniSONE  50 mg Oral Once    diphenhydrAMINE  50 mg Oral Once    hydrocortisone sodium succinate PF  200 mg Intravenous Once       PRN Meds:acetaminophen, melatonin, morphine    Radiology  Echocardiogram Complete 2d With Doppler With Color    Result Date: 12/25/2020  Transthoracic Echocardiography Report (TTE)  Demographics  Patient Name   Richard Manley  Gender              Male                 J  Patient Number [id number]        Race                                                 Ethnicity  Visit Number   [id number]       Room Number         TC04  Corporate ID                   Date of Study       12/25/2020  Accession      [id number]      Referring Physician  Number  Date of Birth  1950      Sonographer         Noemí Duron LPN  Age            72 year(s)      Lynne Jaime  Cardiology                                 Physician           [facility] DO Procedure Type of Study  TTE procedure:ECHO COMPLETE 2D W/DOP W/COLOR. Procedure Date Date: 12/25/2020 Start: 11:31 AM Study Location: Portable Technical Quality: Adequate visualization Indications:Pulmonary embolus.  Patient Status: Routine Height: 60 inches Weight: 202 pounds weight based dosing when appropriate to reduce radiation dose to as low as reasonably achievable. FINDINGS: An approximately 1 cm in caliber saddle pulmonary embolus is present with extensive central pulmonary artery occlusion, greater on the left. The heart is mildly enlarged with the right ventricle enlarged compared to the left without significant intraventricular bowing, consistent with mild to moderate right heart strain. The thoracic aorta is normal in caliber with minimal plaquing. There is no dissection. Postoperative changes from previous CABG are noted. Mild predominantly bandlike opacities are present within the central to superior right upper, central to inferior right lower lobe and to a much lesser extent left mid to lower lung fields. There are no significant groundglass or consolidative infiltrates, pleural or pericardial effusions, worrisome pulmonary nodules,, significant lymphadenopathy, fractures or bone destruction identified. The limited imaging of the included upper abdomen is noncontributory. BILATERAL CENTRAL PULMONARY EMBOLI WITH SADDLE EMBOLUS. MILD TO MODERATE RIGHT HEART STRAIN. MILD SCATTERED BANDLIKE PULMONARY OPACITIES, LIKELY PREDOMINANTLY ATELECTASIS. Ct Cervical Spine Wo Contrast    Result Date: 12/25/2020  CT HEAD WO CONTRAST, CT CERVICAL SPINE WO CONTRAST, CT FACIAL BONES WO CONTRAST : 12/24/2020 CLINICAL HISTORY: Pain after syncope with fall resulting in laceration inferior to the left orbit. COMPARISON: None available. TECHNIQUE: ROUTINE All CT scans at this facility use dose modulation, iterative reconstruction, and/or weight based dosing when appropriate to reduce radiation dose to as low as reasonably achievable. HEAD CT FINDINGS: There is no intracranial hemorrhage, mass effect, midline shift, abnormal extra-axial collection, hydrocephalus, evidence of a recent or remote ischemic infarct or skull fracture identified.   Mild generalized atrophic and involutional changes are present. A posterior fossa arachnoid cyst or mildly prominent developmental priscilla cisterna magna is incidentally noted. FACE CT FINDINGS: There is no fracture, significant paranasal sinus opacification, or soft tissue complication identified. The optic globes, orbits, temporomandibular joints and mandible are intact. CERVICAL SPINE CT FINDINGS: The spine is visualized from the craniovertebral junction nearly through the T1 level. There is no fracture, significant subluxation or paraspinous soft tissue abnormalities identified. Mild to moderate predominantly degenerative endplate changes of the mid to lower levels are present. FINAL IMPRESSION: NO ACUTE INTRACRANIAL PROCESS, FRACTURE, OR EVIDENCE OF CERVICAL SPINE INJURY IDENTIFIED. Xr Chest Portable    Result Date: 12/25/2020  XR CHEST PORTABLE : 12/24/2020 CLINICAL HISTORY:  CP . COMPARISON: None available. TECHNIQUE: A portable upright AP radiograph of the chest was obtained. FINDINGS: Mild streaky perihilar infiltrates are present, greater on the right. The heart is borderline enlarged, exaggerated by technique. There is no significant vascular congestion, pleural effusion, pneumothorax, or displaced fractures identified. Postoperative changes from previous CABG are noted. A few metallic foreign bodies suggestive of staples overlying the left hilum of uncertain significance. MILD STREAKY PERIHILAR INFILTRATES. BORDERLINE CARDIOMEGALY AND PREVIOUS CABG. Us Dup Lower Extremities Bilateral Venous    Result Date: 12/25/2020  US DUP LOWER EXTREMITIES BILATERAL VENOUS : 12/25/2020 CLINICAL HISTORY:  PE . COMPARISON: Chest CTA 12/24/2020. Grayscale, compression, color and waveform Doppler analysis of the both lower extremity deep venous systems was performed.  FINDINGS: Acute-appearing DVT is present with the mid left superficial femoral vein, with possible oscillation of the proximal end, and extension into the occluded left popliteal vein and partial occlusion within the proximal left peroneal vein. There is no significant right lower extremity DVT identified. MODERATELY EXTENSIVE LEFT LOWER EXTREMITY DVT, AS NOTED. IMPRESSION AND SUGGESTION:  1. Saddle pulmonary embolism with right ventricular strain status post PE thrombectomy  2. Recurrent left lower extremity DVT. 3. Syncope secondary to PE  4. Coronary artery disease status post CABG and PCI  5. Hypertension  6. Hyperlipidemia  7. History of remote prostate cancer    Patient is on room air no complaint of shortness of breath. Okay to discharge home. He is on anticoagulation therapy with Xarelto. He has IVC filter placed which can be removed later on. Discussed with Dr. Chastity Germain he will take care of anticoagulation therapy and removal of IVC filter.   Okay to discharge pulmonary wise      Electronically signed by El Paul MD, FCCP on 12/27/2020 at 12:31 PM

## 2020-12-27 NOTE — DISCHARGE SUMMARY
ventricle cavity. Right ventricle global systolic function is severely reduced . Trace (+) mitral regurgitation is present. No evidence of mitral valve stenosis. No evidence of aortic valve regurgitation . No evidence of aortic valve stenosis. The left atrium is Mildly dilated. Signature  ----------------------------------------------------------------  Electronically signed by Anabelle Freeman DO(Interpreting  physician) on 12/25/2020 02:23 PM  ----------------------------------------------------------------  Findings Left Ventricle Normal left ventricular systolic function, no regional wall motion abnormalities, estimated ejection fraction of 60%. Normal left ventricular size and function. Normal left ventricular wall thickness. Impaired relaxation compatible with diastolic dysfunction. ( reversed E/A ratio) Right Ventricle Severely enlarged right ventricle cavity. Right ventricle global systolic function is severely reduced . Left Atrium The left atrium is Mildly dilated. Right Atrium Normal right atrium. Mitral Valve Diffusely thickened and pliable mitral valve leaflets with normal excursion. Trace (+) mitral regurgitation is present. No evidence of mitral valve stenosis. Tricuspid Valve Normal tricuspid valve structure and function. Aortic Valve Sclerotic, trileaflet aortic valve with diffusely thickened leaflets with normal cusp separation and excursion. No evidence of aortic valve regurgitation . No evidence of aortic valve stenosis. Pulmonic Valve Normal pulmonic valve structure and function. Pericardial Effusion No evidence of pericardial effusion. Pleural Effusion No evidence of pleural effusion. Aorta \ Miscellaneous Miscellaneous normal findings were found. M-Mode Measurements (cm)  LVIDd: 3.63 cm                         LVIDs: 2.94 cm  IVSd: 1.03 cm                          IVSs: 1.25 cm  LVPWd: 1.06 cm                         LVPWs: 1.4 cm  Rt. Vent.  Dimension: 3.77 cm           AO Root Dimension: 2.9 cm                                         ACS: 1.96 cm                                         LA: 4.13 cm                                         LVOT: 2.22 cm Doppler Measurements:  AV Velocity:0.02 m/s                   MV Peak E-Wave: 0.31 m/s  AV Peak Gradient: 5.18 mmHg            MV Peak A-Wave: 0.65 m/s  AV Mean Gradient: 2.74 mmHg  AV Area (Continuity):2.46 cm^2         MV P1/2t: 106.8 msec                                         MVA by PHT2.06 cm^2 Valves  Mitral Valve  Peak E-Wave: 0.31 m/s                 Peak A-Wave: 0.65 m/s  P1/2t: 106.8 msec                     E/A Ratio: 0.47                                        Peak Gradient: 0.38 mmHg  Area (PHT): 2.06 cm^2                 Deceleration Time: 335.8 msec  Tissue Doppler  E' Septal Velocity: 0.04 m/s  E' Lateral Velocity: 0.09 m/s  Aortic Valve  Peak Velocity: 1.14 m/s                Mean Velocity: 0.78 m/s  Peak Gradient: 5.18 mmHg               Mean Gradient: 2.74 mmHg  Area (continuity): 2.46 cm^2  AV VTI: 21.35 cm  Cusp Separation: 1.96 cm  Pulmonic Valve  Peak Velocity: 0.67 m/s             Peak Gradient: 1.77 mmHg  LVOT  Peak Velocity: 0.83 m/s              Mean Velocity: 0.53 m/s  Peak Gradient: 2.65 mmHg             Mean Gradient: 1.34 mmHg  LVOT Diameter: 2.22 cm               LVOT VTI: 13.55 cm Structures  Left Atrium  LA Dimension: 4.13 cm                        LA Area: 13.84 cm^2  LA/Aorta: 1.42  LA Volume/Index: 42.01 ml /22 m^2  Left Ventricle  Diastolic Dimension: 7.56 cm         Systolic Dimension: 4.45 cm  Septum Diastolic: 1.21 cm            Septum Systolic: 8.13 cm  PW Diastolic: 3.63 cm                PW Systolic: 1.4 cm                                       FS: 19 %  LV EDV/LV EDV Index: 55.46 ml/30 m^2 LV ESV/LV ESV Index: 33.24 ml/18 m^2  EF Calculated: 40.1 %                LV Length: 7.32 cm  LVOT Diameter: 2.22 cm  Right Ventricle  Diastolic Dimension: 2.95 cm Aorta/ Miscellaneous Aorta  Aortic Root: 2.9 dose to as low as reasonably achievable. HEAD CT FINDINGS: There is no intracranial hemorrhage, mass effect, midline shift, abnormal extra-axial collection, hydrocephalus, evidence of a recent or remote ischemic infarct or skull fracture identified. Mild generalized atrophic and involutional changes are present. A posterior fossa arachnoid cyst or mildly prominent developmental priscilla cisterna magna is incidentally noted. FACE CT FINDINGS: There is no fracture, significant paranasal sinus opacification, or soft tissue complication identified. The optic globes, orbits, temporomandibular joints and mandible are intact. CERVICAL SPINE CT FINDINGS: The spine is visualized from the craniovertebral junction nearly through the T1 level. There is no fracture, significant subluxation or paraspinous soft tissue abnormalities identified. Mild to moderate predominantly degenerative endplate changes of the mid to lower levels are present. FINAL IMPRESSION: NO ACUTE INTRACRANIAL PROCESS, FRACTURE, OR EVIDENCE OF CERVICAL SPINE INJURY IDENTIFIED. Cta Chest W Wo  (pe Study)    Result Date: 12/25/2020  CTA CHEST W WO CONTRAST: 12/24/2020 CLINICAL HISTORY:  D Dimer of >14 . Syncope and left lower extremity pain. COMPARISON: None available. Spiral enhanced images were obtained of the chest during the infusion of approximately 100 mL of Isovue 300 contrast with pulmonary artery  CTA protocol. Routine and volume rendered images were obtained on a 3-dimensional workstation. All CT scans at this facility use dose modulation, iterative reconstruction, and/or weight based dosing when appropriate to reduce radiation dose to as low as reasonably achievable. FINDINGS: An approximately 1 cm in caliber saddle pulmonary embolus is present with extensive central pulmonary artery occlusion, greater on the left.  The heart is mildly enlarged with the right ventricle enlarged compared to the left without significant intraventricular bowing, from the craniovertebral junction nearly through the T1 level. There is no fracture, significant subluxation or paraspinous soft tissue abnormalities identified. Mild to moderate predominantly degenerative endplate changes of the mid to lower levels are present. FINAL IMPRESSION: NO ACUTE INTRACRANIAL PROCESS, FRACTURE, OR EVIDENCE OF CERVICAL SPINE INJURY IDENTIFIED. Xr Chest Portable    Result Date: 12/25/2020  XR CHEST PORTABLE : 12/24/2020 CLINICAL HISTORY:  CP . COMPARISON: None available. TECHNIQUE: A portable upright AP radiograph of the chest was obtained. FINDINGS: Mild streaky perihilar infiltrates are present, greater on the right. The heart is borderline enlarged, exaggerated by technique. There is no significant vascular congestion, pleural effusion, pneumothorax, or displaced fractures identified. Postoperative changes from previous CABG are noted. A few metallic foreign bodies suggestive of staples overlying the left hilum of uncertain significance. MILD STREAKY PERIHILAR INFILTRATES. BORDERLINE CARDIOMEGALY AND PREVIOUS CABG. Us Dup Lower Extremities Bilateral Venous    Result Date: 12/25/2020  US DUP LOWER EXTREMITIES BILATERAL VENOUS : 12/25/2020 CLINICAL HISTORY:  PE . COMPARISON: Chest CTA 12/24/2020. Grayscale, compression, color and waveform Doppler analysis of the both lower extremity deep venous systems was performed. FINDINGS: Acute-appearing DVT is present with the mid left superficial femoral vein, with possible oscillation of the proximal end, and extension into the occluded left popliteal vein and partial occlusion within the proximal left peroneal vein. There is no significant right lower extremity DVT identified. MODERATELY EXTENSIVE LEFT LOWER EXTREMITY DVT, AS NOTED.        Discharge Medications:       [facility] Medication Instructions IAC:[id number]    Printed on:12/27/20 1133   Medication Information                      acetaminophen (TYLENOL) 325 MG tablet  Take 650 mg by mouth every 6 hours as needed for Pain             aspirin 81 MG tablet  Take 81 mg by mouth             carvedilol (COREG) 3.125 MG tablet  Take 3.125 mg by mouth             clonazePAM (KLONOPIN) 2 MG tablet  TAKE 1 TABLET BY MOUTH TWICE A DAY             Coenzyme Q10 (COQ-10) 100 MG CAPS  Take by mouth daily             colchicine (COLCRYS) 0.6 MG tablet  Take 0.6 mg by mouth daily             Evolocumab (REPATHA) 140 MG/ML SOSY  Inject into the skin every 14 days             gabapentin (NEURONTIN) 300 MG capsule  Take 1 capsule by mouth nightly as needed (nerve pain) for up to 30 days. gabapentin (NEURONTIN) 600 MG tablet  Take 1 tablet by mouth 3 times daily as needed (nerve pain) for up to 30 days. lovastatin (MEVACOR) 40 MG tablet  Take 80 mg by mouth             Multiple Vitamin (MULTI-VITAMINS) TABS  TAKE 1 TABLET BY MOUTH EVERY DAY             rivaroxaban (XARELTO) 20 MG TABS tablet  Take 1 tablet by mouth daily (with breakfast) Start after the starter pack             rivaroxaban 15 & 20 MG Starter Pack  Take as directed on package. sacubitril-valsartan (ENTRESTO)  MG per tablet  Take by mouth             temazepam (RESTORIL) 30 MG capsule  TAKE ONE CAPSULE BY MOUTH AT BEDTIME AS NEEDED                 Disposition:   If discharged to Home, Any [place] [id number] needs that were indicated and/or required as been addressed and set up by Social Work. Condition at discharge: good     Activity: activity as tolerated    Total time taken for discharging this patient: 40 minutes. Greater than 70% of time was spent focused exclusively on this patient. Time was taken to review chart, discuss plans with consultants, reconciling medications, discussing plan answering questions with patient.      SignedBoni Seeds  12/27/2020, 11:32 AM  ----------------------------------------------------------------------------------------------------------------------    Magnus Fothergill,

## 2020-12-27 NOTE — PROGRESS NOTES
Lab in to draw. Patient is \"cold and feels like cotton mouth\". Drank some water and gave him a warm blanket. Depends is dry. Denies need for urinal. Call light at side. Bed alarm on for safety.

## 2020-12-27 NOTE — PROGRESS NOTES
lumbar intervertebral disc    Lumbar radiculopathy    Spinal stenosis of lumbar region without neurogenic claudication    Coronary atherosclerosis    Depressive disorder    Malignant neoplasm of prostate ([address]    Major laceration of right kidney    Intrinsic urethral sphincter deficiency    HTN (hypertension)    Hyperlipidemia    Insomnia    C. difficile diarrhea    Herniated lumbar disc without myelopathy    Pulmonary embolus and infarction [facility])     Past Surgical History         Past Surgical History:   Procedure Laterality Date    ABDOMEN SURGERY      large wound revision with mesh / due to non healing wound    CARDIAC SURGERY      CABG x 4 / aortic patch / 2 cardiac stents    COLONOSCOPY      HERNIA REPAIR      bilateral inguinal & umbilical hernia repairs    OTHER SURGICAL HISTORY      due to radical prostatectomy /artificial bladder sphincter    PATELLA FRACTURE SURGERY Right 1979    NV LAMINECTOMY,>2 SGMT,LUMBAR N/A 2/14/2018    EXTRAFORAMINAL L 3-4 MICRODISKECTOMY, RIGHT RIGHT L 3-4 LAMINECTOMY performed by Mahad Edmonds MD at [facility]  2000    radical prostatectomy due to cancer / no trx to follow    TRACHEOTOMY  2011    placement with then closure     Social History   Social History         Socioeconomic History    Marital status:      Spouse name: None    Number of children: None    Years of education: None    Highest education level: None   Occupational History    None   Social Needs    Financial resource strain: None    Food insecurity     Worry: None     Inability: None    Transportation needs     Medical: None     Non-medical: None   Tobacco Use    Smoking status: Never Smoker    Smokeless tobacco: Never Used   Substance and Sexual Activity    Alcohol use: No    Drug use: No    Sexual activity: None   Lifestyle    Physical activity     Days per week: None     Minutes per session: None    Stress: None   Relationships    Social connections     Talks on phone: None     Gets together: None     Attends [employer] service: None     Active member of club or organization: None     Attends meetings of clubs or organizations: None     Relationship status: None    Intimate partner violence     Fear of current or ex partner: None     Emotionally abused: None     Physically abused: None     Forced sexual activity: None   Other Topics Concern    None   Social History Narrative    None     Family History         Family History   Problem Relation Age of Onset    Diabetes Mother     Heart Disease Father     High Cholesterol Sister     High Cholesterol Brother     Heart Disease Brother      Current Facility-Administered Medications             Current Facility-Administered Medications   Medication Dose Route Frequency Provider Last Rate Last Admin    heparin (porcine) injection 7,080 Units 80 Units/kg Intravenous PRN Lisha Clubs Sedar, DO      heparin (porcine) injection 3,540 Units 40 Units/kg Intravenous PRN Lisha Clubs Sedar, DO      heparin 25,000 units in dextrose 5% 250 mL infusion 18 Units/kg/hr Intravenous Continuous Lisha Clubs Sedar, DO  Stopped at 12/25/20 0900    melatonin tablet 3 mg 3 mg Oral Nightly PRN [facility] Sedar, DO      aspirin EC tablet 81 mg 81 mg Oral Daily Lisha [facility] Sedar, DO  Stopped at 12/25/20 0920    carvedilol (COREG) tablet 3.125 mg 3.125 mg Oral BID WC Lisha [facility] Sedar, DO  Stopped at 12/25/20 0920    lovastatin (MEVACOR) tablet 80 mg 80 mg Oral Nightly Uriah D Sedar, DO      niacin (NIASPAN) extended release tablet 1,000 mg 1,000 mg Oral Nightly [facility] Sedar, DO      predniSONE (DELTASONE) tablet 50 mg 50 mg Oral Once Tobin J Holiday, DO      diphenhydrAMINE (BENADRYL) tablet 50 mg 50 mg Oral Once Tobin J Holiday, DO      hydrocortisone sodium succinate PF (SOLU-CORTEF) injection 200 mg 200 mg Intravenous Once Tobin J Holiday, DO      morphine injection 4 mg 4 mg Intravenous Q15 Min PRN Irving Somers PA-C  4 mg at 12/24/20 4241 ALLERGIES: Statins   Review of Systems   Constitutional: Negative for activity change, appetite change, chills, diaphoresis, fatigue and fever. HENT: Negative for facial swelling, nosebleeds and trouble swallowing. Eyes: Negative for discharge, redness and visual disturbance. Respiratory: Positive for shortness of breath. Negative for apnea, cough, choking, chest tightness, wheezing and stridor. Cardiovascular: Positive for leg swelling. Gastrointestinal: Negative. Genitourinary: Negative for difficulty urinating, flank pain, frequency and hematuria. Musculoskeletal: Negative. Negative for neck pain. Skin: Negative. Neurological: Positive for syncope. Negative for dizziness, seizures, speech difficulty, weakness and light-headedness. Psychiatric/Behavioral: Negative for behavioral problems, confusion and sleep disturbance. The patient is not nervous/anxious. VITALS:   Blood pressure (!) 143/93, pulse 73, temperature 98.9 °F (37.2 °C), resp. rate 14, height 6' (1.829 m), weight 202 lb (91.6 kg), SpO2 100 %. Body mass index is 27.4 kg/m². Physical Exam   Constitutional: He is oriented to person, place, and time. He appears well-developed and well-nourished. HENT:   Head: Normocephalic and atraumatic. Eyes: Pupils are equal, round, and reactive to light. Conjunctivae and EOM are normal.   Neck: Normal carotid pulses, no hepatojugular reflux and no JVD present. Carotid bruit is not present. No tracheal deviation present. No thyromegaly present. Cardiovascular: Normal rate, regular rhythm, S1 normal, S2 normal, normal heart sounds and intact distal pulses. PMI is not displaced. Exam reveals no gallop, no S3, no S4 and no friction rub. No murmur heard. Pulses:   Carotid pulses are 3+ on the right side and 3+ on the left side. Radial pulses are 3+ on the right side and 3+ on the left side. Femoral pulses are 3+ on the right side and 3+ on the left side.    Popliteal pulses are 3+ on the right side and 3+ on the left side. Dorsalis pedis pulses are 3+ on the right side and 3+ on the left side. Posterior tibial pulses are 3+ on the right side and 3+ on the left side. Pulmonary/Chest: Effort normal and breath sounds normal. No tachypnea. No respiratory distress. He has no wheezes. He has no rhonchi. He has no rales. He exhibits no tenderness. Abdominal: Soft. Bowel sounds are normal. He exhibits no distension and no ascites. There is no abdominal tenderness. There is no rebound and no guarding. Musculoskeletal: Normal range of motion. General: No tenderness or edema. Neurological: He is alert and oriented to person, place, and time. No cranial nerve deficit. Coordination normal.   Skin: Skin is warm. No rash noted. He is not diaphoretic. No cyanosis or erythema. Nails show no clubbing. Psychiatric: He has a normal mood and affect.  His behavior is normal. Judgment and thought content normal.     LABS:   Recent Results         Recent Results (from the past 24 hour(s))   Comprehensive Metabolic Panel    Collection Time: 12/24/20 10:00 PM   Result Value Ref Range    Sodium 136 135 - 144 mEq/L    Potassium 4.7 3.4 - 4.9 mEq/L    Chloride 101 95 - 107 mEq/L    CO2 23 20 - 31 mEq/L    Anion Gap 12 9 - 15 mEq/L    Glucose 106 (H) 70 - 99 mg/dL    BUN 30 (H) 8 - 23 mg/dL    CREATININE 1.47 (H) 0.70 - 1.20 mg/dL    GFR Non- 47.4 (L) >60    GFR  57.4 (L) >60    Calcium 8.6 8.5 - 9.9 mg/dL    Total Protein 6.8 6.3 - 8.0 g/dL    Alb 3.9 3.5 - 4.6 g/dL    Total Bilirubin <0.2 0.2 - 0.7 mg/dL    Alkaline Phosphatase 55 35 - 104 U/L    ALT 13 0 - 41 U/L    AST 21 0 - 40 U/L    Globulin 2.9 2.3 - 3.5 g/dL   CBC Auto Differential    Collection Time: 12/24/20 10:00 PM   Result Value Ref Range    WBC 7.1 4.8 - 10.8 K/uL    RBC 4.89 4.70 - 6.10 M/uL    Hemoglobin 15.9 14.0 - 18.0 g/dL    Hematocrit 45.4 42.0 - 52.0 %    MCV 92.9 80.0 - 100.0 fL    MCH 32.5 (H) 27.0 - 31.3 pg    MCHC 35.0 33.0 - 37.0 %    RDW 13.5 11.5 - 14.5 %    Platelets 705 389 - 869 K/uL    Neutrophils % 61.8 %    Lymphocytes % 25.1 %    Monocytes % 8.7 %    Eosinophils % 3.8 %    Basophils % 0.6 %    Neutrophils Absolute 4.4 1.4 - 6.5 K/uL    Lymphocytes Absolute 1.8 1.0 - 4.8 K/uL    Monocytes Absolute 0.6 0.2 - 0.8 K/uL    Eosinophils Absolute 0.3 0.0 - 0.7 K/uL    Basophils Absolute 0.0 0.0 - 0.2 K/uL   Troponin    Collection Time: 12/24/20 10:00 PM   Result Value Ref Range    Troponin <0.010 0.000 - 0.010 ng/mL   CK    Collection Time: 12/24/20 10:00 PM   Result Value Ref Range    Total CK 89 0 - 190 U/L   D-Dimer, Quantitative    Collection Time: 12/24/20 10:00 PM   Result Value Ref Range    D-Dimer, Quant 14.11 (HH) 0.00 - 0.50 mg/L FEU   Magnesium    Collection Time: 12/24/20 10:00 PM   Result Value Ref Range    Magnesium 2.5 (H) 1.7 - 2.4 mg/dL   Brain Natriuretic Peptide    Collection Time: 12/24/20 10:00 PM   Result Value Ref Range    Pro- pg/mL   CBC    Collection Time: 12/25/20 12:15 AM   Result Value Ref Range    WBC 8.2 4.8 - 10.8 K/uL    RBC 4.96 4.70 - 6.10 M/uL    Hemoglobin 15.7 14.0 - 18.0 g/dL    Hematocrit 45.7 42.0 - 52.0 %    MCV 92.1 80.0 - 100.0 fL    MCH 31.8 (H) 27.0 - 31.3 pg    MCHC 34.5 33.0 - 37.0 %    RDW 13.4 11.5 - 14.5 %    Platelets 864 (L) 834 - 400 K/uL   APTT    Collection Time: 12/25/20 12:15 AM   Result Value Ref Range    aPTT 24.4 24.4 - 36.8 sec   Protime-INR    Collection Time: 12/25/20 12:15 AM   Result Value Ref Range    Protime 14.3 12.3 - 14.9 sec    INR 1.1    COVID-19    Collection Time: 12/25/20 12:45 AM   Result Value Ref Range    SARS-CoV-2, NAAT Not Detected Not Detected   HEPARIN LEVEL/ANTI-XA    Collection Time: 12/25/20 5:17 AM   Result Value Ref Range    Anti-XA Unfrac Heparin 1.04 IU/mL   CBC    Collection Time: 12/25/20 5:17 AM   Result Value Ref Range    WBC 7.2 4.8 - 10.8 K/uL    RBC 5.26 4.70 - 6.10 M/uL    Hemoglobin 16.5 14.0 - 18.0 g/dL    Hematocrit 48.9 42.0 - 52.0 %    MCV 92.9 80.0 - 100.0 fL    MCH 31.3 27.0 - 31.3 pg    MCHC 33.7 33.0 - 37.0 %    RDW 13.7 11.5 - 14.5 %    Platelets 210 (L) 346 - 400 K/uL   PSA SCREENING    Collection Time: 12/25/20 5:17 AM   Result Value Ref Range    PSA 0.01 0.00 - 6.22 ng/mL   SPECIMEN REJECTION    Collection Time: 12/25/20 7:23 AM   Result Value Ref Range    Rejected Test PT PTT     Reason for Rejection see below    Protime-INR    Collection Time: 12/25/20 7:41 AM   Result Value Ref Range    Protime 14.9 12.3 - 14.9 sec    INR 1.2    APTT    Collection Time: 12/25/20 7:41 AM   Result Value Ref Range    aPTT >150.0 (HH) 24.4 - 36.8 sec   HEPARIN LEVEL/ANTI-XA    Collection Time: 12/25/20 3:57 PM   Result Value Ref Range    Anti-XA Unfrac Heparin 0.50 IU/mL   Troponin:         Lab Results   Component Value Date    TROPONINI <0.010 12/24/2020     ASSESSMENT:     saddle pulmonary embolism with RV strain. S/p PE thrombectomy  Recurrence left lower extremity DVT   Syncope secondary to PE   History of coronary disease status post CABG/PCI   Essential hypertension   Hyperlipidemia   History of remote prostate cancer       PLAN:   1. As always, aggressive risk factor modification is strongly recommended. We should adhere to the JNC VIII guidelines for HTN management and the NCEPATP III guidelines for LDL-C management. 2. Cont with DOAC for life  3. Monitor H&H  4. GI prophylaxis  5. Remove IVCF in future  6. [facility].        Electronically signed by Armen Mallory DO on 12/27/2020 at 9:18 AM

## 2020-12-27 NOTE — CARE COORDINATION
Met with patient, dc plan remains home with wife. [facility] Call placed to [facility] and they do not have starter pack available, call placed to [facility], they do not carry starter packs. Call placed to [facility], one starter pack available and will be obtaining rx from [facility]. xarelto 30 day free card [facility] to [facility] and patient aware. Per pharmacy patient responsibility will be 23.14 after 30 day trial. But will change after the new year.

## 2020-12-27 NOTE — CONSULTS
Pulmonary Medicine  Consult Note      Reason for consultation: Left lower extremity DVT and pulmonary embolism    Consulting physician: Dr. Atilio James:      This is 79-year-old male who was presented with chief complaint of syncope. Patient has coronary artery disease status post CABG and PCI, hypertension, hyperlipidemia was presented to [facility] after he experienced episode of syncope. Patient said he was walking home and experienced lightheadedness dizziness and suffered a syncopal episode with facial trauma. He has no chest pain pleuritic pain. No shortness of breath at rest though he does have a short of breath with minimal exertion. He had saddle pulmonary embolism associated with right ventricular strain. Patient underwent for PE thrombectomy today. I discussed with Dr. Byrd Shone. Patient did well during procedure. I have seen patient in room today and he is denies having any complaint. He was underwent for IVC filter placement due to DVT on the left leg. He is on room air and O2 saturation 97%. He denies having any fever or chills. No nausea vomiting or diarrhea.       Past Medical History:        Diagnosis Date    CAD (coronary artery disease)     CABG X 4 / Aortic patch / 2010 2 cardiac stents    Cancer ([facility] [address]     prostate with OR    Hx of blood clots 2002    brain venous thrombosis / had been off aspirin for 1 week & had a seizure    Hyperlipidemia     meds since 1996    MI (myocardial infarction) ([facility] [address] 1996     CABG x 4 with aortic patch / 2010 2 cardiac stents       Past Surgical History:        Procedure Laterality Date    ABDOMEN SURGERY      large wound revision with mesh / due to non healing wound    CARDIAC SURGERY      CABG x 4 / aortic patch / 2 cardiac stents    COLONOSCOPY      HERNIA REPAIR      bilateral inguinal & umbilical hernia repairs    OTHER SURGICAL HISTORY      due to radical prostatectomy /artificial bladder sphincter    PATELLA rate. Regular rhythm. No mumur ,  Rub or gallop  ABD: Non-tender. Non-distended. No masses. No organmegaly. Normal bowel sounds. No hernia. Musculoskeleton: normal range of motion in all extremites, strength and tone   Extremities: No pitting in both lower leg , No Cyanosis ,No clubbing  Neuro: no cranial nerve abnormality, normal reflex and sensation, no focal weakness   Skin: Warm and dry. No erythema rash on exposed extremities. Data Review  Recent Labs     12/25/20  0015 12/25/20  0517 12/26/20  0535   WBC 8.2 7.2 7.0   HGB 15.7 16.5 15.7   HCT 45.7 48.9 46.6   * 124* 113*      Recent Labs     12/24/20  2200 12/26/20  0535    142   K 4.7 4.6    109*   CO2 23 23   BUN 30* 17   CREATININE 1.47* 1.44*   GLUCOSE 106* 92       MV Settings: ABGs: No results for input(s): PHART, GQC5MVU, PO2ART, BWC2GJD, BEART, A8NOSADG, OPI3TBX in the last 72 hours. O2 Device: None (Room air)  No results found for: [address]    Radiology  Echocardiogram Complete 2d With Doppler With Color    Result Date: 12/25/2020  Transthoracic Echocardiography Report (TTE)  Demographics  Patient Name   Crystal Zhong  Gender              Male                 J  Patient Number [id number]        Race                                                 Ethnicity  Visit Number   [id number]       Room Number         TC04  Corporate ID                   Date of Study       12/25/2020  Accession      [id number]      Referring Physician  Number  Date of Birth  1950      Sonographer         Darryl Alfonso LPN  Age            72 year(s)      Lynne Jaime  Cardiology                                 Physician           Joanna DURHAM DO Procedure Type of Study  TTE procedure:ECHO COMPLETE 2D W/DOP W/COLOR.  Procedure Date Date: 12/25/2020 Start: 11:31 AM Study Location: Portable Technical Quality: Adequate visualization Indications:Pulmonary embolus. Patient Status: Routine Height: 60 inches Weight: 202 pounds BSA: 1.87 m^2 BMI: 39.45 kg/m^2 BP: 142/90 mmHg  Conclusions  Summary  Normal left ventricular systolic function, no regional wall motion  abnormalities, estimated ejection fraction of 60%. Normal left ventricular size and function. Normal left ventricular wall thickness. Impaired relaxation compatible with diastolic dysfunction. ( reversed E/A  ratio)  Severely enlarged right ventricle cavity. Right ventricle global systolic function is severely reduced . Trace (+) mitral regurgitation is present. No evidence of mitral valve stenosis. No evidence of aortic valve regurgitation . No evidence of aortic valve stenosis. The left atrium is Mildly dilated. Signature  ----------------------------------------------------------------  Electronically signed by Anabelle Freeman DO(Interpreting  physician) on 12/25/2020 02:23 PM  ----------------------------------------------------------------  Findings Left Ventricle Normal left ventricular systolic function, no regional wall motion abnormalities, estimated ejection fraction of 60%. Normal left ventricular size and function. Normal left ventricular wall thickness. Impaired relaxation compatible with diastolic dysfunction. ( reversed E/A ratio) Right Ventricle Severely enlarged right ventricle cavity. Right ventricle global systolic function is severely reduced . Left Atrium The left atrium is Mildly dilated. Right Atrium Normal right atrium. Mitral Valve Diffusely thickened and pliable mitral valve leaflets with normal excursion. Trace (+) mitral regurgitation is present. No evidence of mitral valve stenosis. Tricuspid Valve Normal tricuspid valve structure and function. Aortic Valve Sclerotic, trileaflet aortic valve with diffusely thickened leaflets with normal cusp separation and excursion. No evidence of aortic valve regurgitation . No evidence of aortic valve stenosis. Pulmonic Valve Normal pulmonic valve structure and function. Pericardial Effusion No evidence of pericardial effusion. Pleural Effusion No evidence of pleural effusion. Aorta \ Miscellaneous Miscellaneous normal findings were found. M-Mode Measurements (cm)  LVIDd: 3.63 cm                         LVIDs: 2.94 cm  IVSd: 1.03 cm                          IVSs: 1.25 cm  LVPWd: 1.06 cm                         LVPWs: 1.4 cm  Rt. Vent.  Dimension: 3.77 cm           AO Root Dimension: 2.9 cm                                         ACS: 1.96 cm                                         LA: 4.13 cm                                         LVOT: 2.22 cm Doppler Measurements:  AV Velocity:0.02 m/s                   MV Peak E-Wave: 0.31 m/s  AV Peak Gradient: 5.18 mmHg            MV Peak A-Wave: 0.65 m/s  AV Mean Gradient: 2.74 mmHg  AV Area (Continuity):2.46 cm^2         MV P1/2t: 106.8 msec                                         MVA by PHT2.06 cm^2 Valves  Mitral Valve  Peak E-Wave: 0.31 m/s                 Peak A-Wave: 0.65 m/s  P1/2t: 106.8 msec                     E/A Ratio: 0.47                                        Peak Gradient: 0.38 mmHg  Area (PHT): 2.06 cm^2                 Deceleration Time: 335.8 msec  Tissue Doppler  E' Septal Velocity: 0.04 m/s  E' Lateral Velocity: 0.09 m/s  Aortic Valve  Peak Velocity: 1.14 m/s                Mean Velocity: 0.78 m/s  Peak Gradient: 5.18 mmHg               Mean Gradient: 2.74 mmHg  Area (continuity): 2.46 cm^2  AV VTI: 21.35 cm  Cusp Separation: 1.96 cm  Pulmonic Valve  Peak Velocity: 0.67 m/s             Peak Gradient: 1.77 mmHg  LVOT  Peak Velocity: 0.83 m/s              Mean Velocity: 0.53 m/s  Peak Gradient: 2.65 mmHg             Mean Gradient: 1.34 mmHg  LVOT Diameter: 2.22 cm               LVOT VTI: 13.55 cm Structures  Left Atrium  LA Dimension: 4.13 cm                        LA Area: 13.84 cm^2  LA/Aorta: 1.42  LA Volume/Index: 42.01 ml /22 m^2  Left Ventricle  Diastolic CERVICAL SPINE INJURY IDENTIFIED. Ct Facial Bones Wo Contrast    Result Date: 12/25/2020  CT HEAD WO CONTRAST, CT CERVICAL SPINE WO CONTRAST, CT FACIAL BONES WO CONTRAST : 12/24/2020 CLINICAL HISTORY: Pain after syncope with fall resulting in laceration inferior to the left orbit. COMPARISON: None available. TECHNIQUE: ROUTINE All CT scans at this facility use dose modulation, iterative reconstruction, and/or weight based dosing when appropriate to reduce radiation dose to as low as reasonably achievable. HEAD CT FINDINGS: There is no intracranial hemorrhage, mass effect, midline shift, abnormal extra-axial collection, hydrocephalus, evidence of a recent or remote ischemic infarct or skull fracture identified. Mild generalized atrophic and involutional changes are present. A posterior fossa arachnoid cyst or mildly prominent developmental priscilla cisterna magna is incidentally noted. FACE CT FINDINGS: There is no fracture, significant paranasal sinus opacification, or soft tissue complication identified. The optic globes, orbits, temporomandibular joints and mandible are intact. CERVICAL SPINE CT FINDINGS: The spine is visualized from the craniovertebral junction nearly through the T1 level. There is no fracture, significant subluxation or paraspinous soft tissue abnormalities identified. Mild to moderate predominantly degenerative endplate changes of the mid to lower levels are present. FINAL IMPRESSION: NO ACUTE INTRACRANIAL PROCESS, FRACTURE, OR EVIDENCE OF CERVICAL SPINE INJURY IDENTIFIED. Cta Chest W Wo  (pe Study)    Result Date: 12/25/2020  CTA CHEST W WO CONTRAST: 12/24/2020 CLINICAL HISTORY:  D Dimer of >14 . Syncope and left lower extremity pain. COMPARISON: None available. Spiral enhanced images were obtained of the chest during the infusion of approximately 100 mL of Isovue 300 contrast with pulmonary artery  CTA protocol.  Routine and volume rendered images were obtained on a 3-dimensional workstation. All CT scans at this facility use dose modulation, iterative reconstruction, and/or weight based dosing when appropriate to reduce radiation dose to as low as reasonably achievable. FINDINGS: An approximately 1 cm in caliber saddle pulmonary embolus is present with extensive central pulmonary artery occlusion, greater on the left. The heart is mildly enlarged with the right ventricle enlarged compared to the left without significant intraventricular bowing, consistent with mild to moderate right heart strain. The thoracic aorta is normal in caliber with minimal plaquing. There is no dissection. Postoperative changes from previous CABG are noted. Mild predominantly bandlike opacities are present within the central to superior right upper, central to inferior right lower lobe and to a much lesser extent left mid to lower lung fields. There are no significant groundglass or consolidative infiltrates, pleural or pericardial effusions, worrisome pulmonary nodules,, significant lymphadenopathy, fractures or bone destruction identified. The limited imaging of the included upper abdomen is noncontributory. BILATERAL CENTRAL PULMONARY EMBOLI WITH SADDLE EMBOLUS. MILD TO MODERATE RIGHT HEART STRAIN. MILD SCATTERED BANDLIKE PULMONARY OPACITIES, LIKELY PREDOMINANTLY ATELECTASIS. Ct Cervical Spine Wo Contrast    Result Date: 12/25/2020  CT HEAD WO CONTRAST, CT CERVICAL SPINE WO CONTRAST, CT FACIAL BONES WO CONTRAST : 12/24/2020 CLINICAL HISTORY: Pain after syncope with fall resulting in laceration inferior to the left orbit. COMPARISON: None available. TECHNIQUE: ROUTINE All CT scans at this facility use dose modulation, iterative reconstruction, and/or weight based dosing when appropriate to reduce radiation dose to as low as reasonably achievable.  HEAD CT FINDINGS: There is no intracranial hemorrhage, mass effect, midline shift, abnormal extra-axial collection, hydrocephalus, evidence of a recent or remote ischemic infarct or skull fracture identified. Mild generalized atrophic and involutional changes are present. A posterior fossa arachnoid cyst or mildly prominent developmental priscilla cisterna magna is incidentally noted. FACE CT FINDINGS: There is no fracture, significant paranasal sinus opacification, or soft tissue complication identified. The optic globes, orbits, temporomandibular joints and mandible are intact. CERVICAL SPINE CT FINDINGS: The spine is visualized from the craniovertebral junction nearly through the T1 level. There is no fracture, significant subluxation or paraspinous soft tissue abnormalities identified. Mild to moderate predominantly degenerative endplate changes of the mid to lower levels are present. FINAL IMPRESSION: NO ACUTE INTRACRANIAL PROCESS, FRACTURE, OR EVIDENCE OF CERVICAL SPINE INJURY IDENTIFIED. Xr Chest Portable    Result Date: 12/25/2020  XR CHEST PORTABLE : 12/24/2020 CLINICAL HISTORY:  CP . COMPARISON: None available. TECHNIQUE: A portable upright AP radiograph of the chest was obtained. FINDINGS: Mild streaky perihilar infiltrates are present, greater on the right. The heart is borderline enlarged, exaggerated by technique. There is no significant vascular congestion, pleural effusion, pneumothorax, or displaced fractures identified. Postoperative changes from previous CABG are noted. A few metallic foreign bodies suggestive of staples overlying the left hilum of uncertain significance. MILD STREAKY PERIHILAR INFILTRATES. BORDERLINE CARDIOMEGALY AND PREVIOUS CABG. Us Dup Lower Extremities Bilateral Venous    Result Date: 12/25/2020  US DUP LOWER EXTREMITIES BILATERAL VENOUS : 12/25/2020 CLINICAL HISTORY:  PE . COMPARISON: Chest CTA 12/24/2020. Grayscale, compression, color and waveform Doppler analysis of the both lower extremity deep venous systems was performed.  FINDINGS: Acute-appearing DVT is present with the mid left superficial femoral vein, with possible oscillation of the proximal end, and extension into the occluded left popliteal vein and partial occlusion within the proximal left peroneal vein. There is no significant right lower extremity DVT identified. MODERATELY EXTENSIVE LEFT LOWER EXTREMITY DVT, AS NOTED. Assessment and  plan:     1. Saddle pulmonary embolism with right ventricular strain status post PE thrombectomy  2. Recurrent left lower extremity DVT. 3. Syncope secondary to PE  4. Coronary artery disease status post CABG and PCI  5. Hypertension  6. Hyperlipidemia  7. History of remote prostate cancer      Wife at bedside discussed with her. He came after PE thrombectomy. He did well during procedure. [facility] Dr [facility] also has IVC filter placed due to left leg DVT. He is currently on Xarelto 15 mg p.o. twice daily. He will need lifelong anticoagulation therapy. He is on room air O2 saturation is 98%.   We will follow    Thank you for this consult    Electronically signed by Kae Hernadez MD, [facility] on 12/26/2020 at 7:12 PM

## 2020-12-28 LAB
PERFORMED ON: ABNORMAL
PERFORMED ON: NORMAL
POC ACTIVATED CLOTTING TIME KAOLIN: 147 SEC (ref 82–152)
POC ACTIVATED CLOTTING TIME KAOLIN: 208 SEC (ref 82–152)
POC ACTIVATED CLOTTING TIME KAOLIN: 241 SEC (ref 82–152)
POC ACTIVATED CLOTTING TIME KAOLIN: 246 SEC (ref 82–152)
POC ACTIVATED CLOTTING TIME KAOLIN: 263 SEC (ref 82–152)
POC SAMPLE TYPE: ABNORMAL
POC SAMPLE TYPE: NORMAL

## 2021-01-04 NOTE — PROCEDURES
[address]                      [address], [address] [facility]                                 PROCEDURE NOTE    PATIENT NAME: Brenna Jones                   :        1950  MED REC NO:   [id number]                            ROOM:       04  ACCOUNT NO:   [de-identified]                           ADMIT DATE: 2020  PROVIDER:     Shirley Vicente DO    DATE OF PROCEDURE:  2020    PROCEDURES PERFORMED:  Bilateral pulmonary angiography, right main  pulmonary artery as well as right segmental pulmonary embolism  thrombectomy with Inari ClotTriever device. INDICATIONS:  Symptomatic pulmonary embolism. PROCEDURE PERFORMED BY:  Tobin Vicente DO    COMPLICATIONS:  None. ACCESS SITE:  Right common femoral vein. DESCRIPTION OF PROCEDURE:  The patient was brought to the cardiac cath  lab suite, where he was sterilely prepped and draped in the usual  fashion. 1% lidocaine was used to anesthetize to the right inguinal  area and a 7-[place] venous sheath was placed without any difficulty. Next, an 0.035 [place] catheter was placed in the pulmonary artery and  PA pressures were obtained and O2 saturations were obtained. Next, a  pigtail catheter was placed over the wire into the right and left main  pulmonary arteries and angiogram of the left and right pulmonary system  was obtained under DSA.   Next, over an 0.035 wire a 5-[place]  multipurpose catheter was advanced over the adjacent wire into the  distal right pulmonary artery vessels and exchanged for an 0.035 Amplatz  Super Stiff catheter and the right common femoral artery sheath was  up-sized with 24-[place] dry sterile sheath and Inari ClotTriever device  was introduced over the wire into the right distal main pulmonary artery  as well as proximal segmental branch of the right pulmonary lobe and  aspiration was performed with significant amount of thrombus extruded from the lungs. Complete angiography results were performed showing  excellent results. Next, all catheters and wires were removed from the  patient. Pulmonary embolism thrombectomy was performed in the PA branch  of the right upper lobe as well. The sheath was pulled and a  figure-of-eight stitch was deployed and the patient was transferred to  postcath holding area in stable and satisfactory condition. FINDINGS:  1. Left pulmonary artery angiogram shows a moderate-sized clot in the  distal left main pulmonary artery as well as stenting of the left upper  lobe branch in the segmental segment. 2.  Right pulmonary angiogram shows a large clot burden at distal right  main pulmonary artery. It was extending into the inferior lobe  segmental and subsegmental branches. There is also large amount of  thrombus in the PA branch of the right upper lobe. ASSESSMENT:  1.  Status post successful right main pulmonary artery as well as  segmental branches thrombectomy with Inari ClotTriever device. 2.  Bilateral main pulmonary artery pulmonary embolism. PLAN:  1. Postprocedure care as usual.  2.  As always, aggressive risk factor modification. 3.  Maximize medical therapy. 4.  Continue with anticoagulation, monitor closely in the intensive care  unit.         Annette Bush DO    D: 01/04/2021 16:15:57       T: 01/04/2021 17:14:17     [email or web address]  Job#: [id number]     Doc#: [id number]    CC:

## 2021-01-15 ENCOUNTER — OFFICE VISIT (OUTPATIENT)
Dept: CARDIOLOGY CLINIC | Age: 71
End: 2021-01-15
Payer: MEDICARE

## 2021-01-15 VITALS
HEART RATE: 57 BPM | DIASTOLIC BLOOD PRESSURE: 62 MMHG | OXYGEN SATURATION: 98 % | WEIGHT: 200.6 LBS | RESPIRATION RATE: 14 BRPM | SYSTOLIC BLOOD PRESSURE: 102 MMHG | BODY MASS INDEX: 27.21 KG/M2

## 2021-01-15 DIAGNOSIS — Z86.718 HISTORY OF DEEP VENOUS THROMBOSIS (DVT) OF DISTAL VEIN OF LEFT LOWER EXTREMITY: ICD-10-CM

## 2021-01-15 DIAGNOSIS — I26.99 PULMONARY EMBOLUS AND INFARCTION (HCC): ICD-10-CM

## 2021-01-15 DIAGNOSIS — E78.2 MIXED HYPERLIPIDEMIA: Chronic | ICD-10-CM

## 2021-01-15 DIAGNOSIS — I10 HYPERTENSION, UNSPECIFIED TYPE: Primary | Chronic | ICD-10-CM

## 2021-01-15 PROCEDURE — G8484 FLU IMMUNIZE NO ADMIN: HCPCS | Performed by: INTERNAL MEDICINE

## 2021-01-15 PROCEDURE — 1123F ACP DISCUSS/DSCN MKR DOCD: CPT | Performed by: INTERNAL MEDICINE

## 2021-01-15 PROCEDURE — G8417 CALC BMI ABV UP PARAM F/U: HCPCS | Performed by: INTERNAL MEDICINE

## 2021-01-15 PROCEDURE — 3017F COLORECTAL CA SCREEN DOC REV: CPT | Performed by: INTERNAL MEDICINE

## 2021-01-15 PROCEDURE — 99214 OFFICE O/P EST MOD 30 MIN: CPT | Performed by: INTERNAL MEDICINE

## 2021-01-15 PROCEDURE — 4040F PNEUMOC VAC/ADMIN/RCVD: CPT | Performed by: INTERNAL MEDICINE

## 2021-01-15 PROCEDURE — 1111F DSCHRG MED/CURRENT MED MERGE: CPT | Performed by: INTERNAL MEDICINE

## 2021-01-15 PROCEDURE — 1036F TOBACCO NON-USER: CPT | Performed by: INTERNAL MEDICINE

## 2021-01-15 PROCEDURE — G8427 DOCREV CUR MEDS BY ELIG CLIN: HCPCS | Performed by: INTERNAL MEDICINE

## 2021-01-15 NOTE — PROGRESS NOTES
Chief Complaint   Patient presents with    Follow-Up from [facility] 12/24/20    Pulmonary Embolism    Hypertension      12/25/2020: Patient is a 72 y.o. male who presents with a chief complaint of syncope. Patient is followed on a regular basis by Dr. Mabel Dickey MD.  Patient with past medical sacral disease, status post CABG/PCI, hypertension, hyperlipidemia who presented to [facility] after experiencing a syncopal episode. Patient states he was walking at home, experienced lightheadedness and dizziness and suffered a syncopal episode with facial trauma. He denies any chest pain. Patient states he has been having left lower extremity pain and edema for the past 2 weeks. He does also complain of shortness of breath with very minimal exertion. He was noted to have saddle pulmonary embolism with associated RV strain. Troponins are negative so far. Patient is hemodynamically stable at rest.  Patient with history of lower extremity DVT post cardiac catheterization 1 year ago. He was seen by heme-onc occluded clinic and was taken off of oral anticoagulation this past year. Bilateral extremity venous duplex ultrasound shows left DVT from mid femoral vein extending to the below the knee vessels. Status post echo with normal LV function, dilated right ventricle with decreased function. No significant pulmonary hypertension      1/15/2021: Patient presents for initial medical evaluation. Patient is followed on a regular basis by Dr. Mabel Dickey MD.  Status post hospitalization for syncopal episode noted to have bilateral main pulmonary artery pulmonary embolism status post thrombectomy. Status post IVC filter placement. Status post lower extremity venous duplex ultrasound with moderately extensive left extremity DVT from mid left superficial femoral vein. Does have left lower extremity edema. He is breathing much better. He is compliant with his oral anticoagulation.   Status post echo ejection fraction of 60%, severely enlarged right ventricle/reduced RV systolic function grade 1 diastolic dysfunction.         Patient Active Problem List   Diagnosis    Herniated lumbar intervertebral disc    Lumbar radiculopathy    Spinal stenosis of lumbar region without neurogenic claudication    Coronary atherosclerosis    Depressive disorder    Malignant neoplasm of prostate ([address]    Major laceration of right kidney    Intrinsic urethral sphincter deficiency    HTN (hypertension)    Hyperlipidemia    Insomnia    C. difficile diarrhea    Herniated lumbar disc without myelopathy    Pulmonary embolus and infarction (HCC)    History of deep venous thrombosis (DVT) of distal vein of left lower extremity       Past Surgical History:   Procedure Laterality Date    ABDOMEN SURGERY      large wound revision with mesh / due to non healing wound    CARDIAC SURGERY      CABG x 4 / aortic patch / 2 cardiac stents    COLONOSCOPY      HERNIA REPAIR      bilateral inguinal & umbilical hernia repairs    OTHER SURGICAL HISTORY      due to radical prostatectomy /artificial bladder sphincter    PATELLA FRACTURE SURGERY Right 1979    [place] LAMINECTOMY,>2 SGMT,LUMBAR N/A 2/14/2018    EXTRAFORAMINAL L 3-4 MICRODISKECTOMY, RIGHT  RIGHT L 3-4 LAMINECTOMY performed by Charlene Armenta MD at [address] [facility]  2000    radical prostatectomy due to cancer / no trx to follow    TRACHEOTOMY  2011    placement with then closure       Social History     Socioeconomic History    Marital status:      Spouse name: None    Number of children: None    Years of education: None    Highest education level: None   Occupational History    None   Social Needs    Financial resource strain: None    Food insecurity     Worry: None     Inability: None    Transportation needs     Medical: None     Non-medical: None   Tobacco Use    Smoking status: Never Smoker    Smokeless tobacco: Never Used   Substance and Sexual Activity    Alcohol use: No    Drug use: No    Sexual activity: None   Lifestyle    Physical activity     Days per week: None     Minutes per session: None    Stress: None   Relationships    Social connections     Talks on phone: None     Gets together: None     Attends Orthodoxy service: None     Active member of club or organization: None     Attends meetings of clubs or organizations: None     Relationship status: None    Intimate partner violence     Fear of current or ex partner: None     Emotionally abused: None     Physically abused: None     Forced sexual activity: None   Other Topics Concern    None   Social History Narrative    None       Family History   Problem Relation Age of Onset    Diabetes Mother     Heart Disease Father     High Cholesterol Sister     High Cholesterol Brother     Heart Disease Brother        Current Outpatient Medications   Medication Sig Dispense Refill    Elastic Bandages & Supports (JOBST ACTIVE 20-30MMHG MEDIUM) MISC Bilateral thigh-high compression stockings 20 to 30 mmHg. 1 each 0    rivaroxaban 15 & 20 MG Starter Pack Take as directed on package.  1 Package 0    [START ON 1/27/2021] rivaroxaban (XARELTO) 20 MG TABS tablet Take 1 tablet by mouth daily (with breakfast) Start after the starter pack 30 tablet 5    sacubitril-valsartan (ENTRESTO)  MG per tablet Take by mouth      colchicine (COLCRYS) 0.6 MG tablet Take 0.6 mg by mouth daily      Coenzyme Q10 (COQ-10) 100 MG CAPS Take by mouth daily      Evolocumab (REPATHA) 140 MG/ML SOSY Inject into the skin every 14 days      acetaminophen (TYLENOL) 325 MG tablet Take 650 mg by mouth every 6 hours as needed for Pain      aspirin 81 MG tablet Take 81 mg by mouth      carvedilol (COREG) 3.125 MG tablet Take 3.125 mg by mouth      clonazePAM (KLONOPIN) 2 MG tablet TAKE 1 TABLET BY MOUTH TWICE A DAY  0    Multiple Vitamin (MULTI-VITAMINS) TABS TAKE 1 TABLET BY MOUTH EVERY DAY  3    lovastatin (MEVACOR) 40 MG tablet Take 80 mg by mouth      temazepam (RESTORIL) 30 MG capsule TAKE ONE CAPSULE BY MOUTH AT BEDTIME AS NEEDED  0    gabapentin (NEURONTIN) 600 MG tablet Take 1 tablet by mouth 3 times daily as needed (nerve pain) for up to 30 days. 90 tablet 0    gabapentin (NEURONTIN) 300 MG capsule Take 1 capsule by mouth nightly as needed (nerve pain) for up to 30 days. 30 capsule 1     No current facility-administered medications for this visit. Statins    Review of Systems:  General ROS: negative  Psychological ROS: negative  Hematological and Lymphatic ROS: No history of blood clots or bleeding disorder. Respiratory ROS: no cough, shortness of breath, or wheezing  Cardiovascular ROS: no chest pain or dyspnea on exertion  Gastrointestinal ROS: no abdominal pain, change in bowel habits, or black or bloody stools  Genito-Urinary ROS: no dysuria, trouble voiding, or hematuria  Musculoskeletal ROS: negative  Neurological ROS: no TIA or stroke symptoms  Dermatological ROS: negative    VITALS:  Blood pressure 102/62, pulse 57, resp. rate 14, weight 200 lb 9.6 oz (91 kg), SpO2 98 %. Body mass index is 27.21 kg/m². Physical Examination:  General appearance - alert, well appearing, and in no distress  Mental status - alert, oriented to person, place, and time  Neck - Neck is supple, no JVD or carotid bruits. No thyromegaly or adenopathy.    Chest - clear to auscultation, no wheezes, rales or rhonchi, symmetric air entry  Heart - normal rate, regular rhythm, normal S1, S2, no murmurs, rubs, clicks or gallops  Abdomen - soft, nontender, nondistended, no masses or organomegaly  Neurological - alert, oriented, normal speech, no focal findings or movement disorder noted  Extremities - peripheral pulses normal, no pedal edema, no clubbing or cyanosis  Skin - normal coloration and turgor, no rashes, no suspicious skin lesions noted        No orders of the defined types were placed in this encounter. ASSESSMENT:     Diagnosis Orders   1. Hypertension, unspecified type     2. Pulmonary embolus and infarction ([address]     3. Mixed hyperlipidemia     4. History of deep venous thrombosis (DVT) of distal vein of left lower extremity           PLAN:     Patient will need to continue to follow up with you for their general medical care     As always, aggressive risk factor modification is strongly recommended. We should adhere to the JNC VIII guidelines for HTN management and the NCEP ATP III guidelines for LDL-C management. Cardiac diet is always recommended with low fat, cholesterol, calories and sodium. Continue medications at current doses. Continue with oral anticoagulation. Pression stockings. Repeat left lower extremity venous duplex ultrasound in the near future. If continues to have symptoms or significant clot burden then will consider left lower extremity DVT thrombectomy. Consider removing IVC filter in 3 months.

## 2021-01-18 DIAGNOSIS — R60.0 LOWER EXTREMITY EDEMA: Primary | ICD-10-CM

## 2021-01-18 DIAGNOSIS — Z86.718 PERSONAL HISTORY OF VENOUS THROMBOSIS AND EMBOLISM: ICD-10-CM

## 2021-01-20 ENCOUNTER — CLINICAL DOCUMENTATION (OUTPATIENT)
Dept: CARDIOLOGY CLINIC | Age: 71
End: 2021-01-20

## 2021-02-26 ENCOUNTER — TELEPHONE (OUTPATIENT)
Dept: CARDIOLOGY CLINIC | Age: 71
End: 2021-02-26

## 2021-02-26 NOTE — TELEPHONE ENCOUNTER
Left message on machine (with Wife) to call back. Per Dr. Justino Kinney (PCP) office calling as patient has not received any information regarding Entresto patient assistance. I do not see nay scanned documents in Epic regarding PAF for Entresto. I did call [employer] and they did receive information 1/12/21 that was missing the physician office address. The form. However, came from Dr. Amy Cr office with [facility]. Per Dr. Justino Kinney office I contacted Lalit Alston (wife) to clarify but I had to leave a message for her to call back.

## 2021-04-16 ENCOUNTER — OFFICE VISIT (OUTPATIENT)
Dept: CARDIOLOGY CLINIC | Age: 71
End: 2021-04-16
Payer: MEDICARE

## 2021-04-16 VITALS
DIASTOLIC BLOOD PRESSURE: 68 MMHG | WEIGHT: 204 LBS | OXYGEN SATURATION: 98 % | RESPIRATION RATE: 14 BRPM | HEART RATE: 69 BPM | SYSTOLIC BLOOD PRESSURE: 102 MMHG | BODY MASS INDEX: 27.67 KG/M2

## 2021-04-16 DIAGNOSIS — Z95.828 PRESENCE OF IVC FILTER: ICD-10-CM

## 2021-04-16 DIAGNOSIS — I25.10 ATHEROSCLEROSIS OF NATIVE CORONARY ARTERY OF NATIVE HEART, ANGINA PRESENCE UNSPECIFIED: ICD-10-CM

## 2021-04-16 DIAGNOSIS — I10 HYPERTENSION, UNSPECIFIED TYPE: Primary | Chronic | ICD-10-CM

## 2021-04-16 DIAGNOSIS — E78.2 MIXED HYPERLIPIDEMIA: Chronic | ICD-10-CM

## 2021-04-16 PROCEDURE — 3017F COLORECTAL CA SCREEN DOC REV: CPT | Performed by: INTERNAL MEDICINE

## 2021-04-16 PROCEDURE — G8427 DOCREV CUR MEDS BY ELIG CLIN: HCPCS | Performed by: INTERNAL MEDICINE

## 2021-04-16 PROCEDURE — 1036F TOBACCO NON-USER: CPT | Performed by: INTERNAL MEDICINE

## 2021-04-16 PROCEDURE — 4040F PNEUMOC VAC/ADMIN/RCVD: CPT | Performed by: INTERNAL MEDICINE

## 2021-04-16 PROCEDURE — G8417 CALC BMI ABV UP PARAM F/U: HCPCS | Performed by: INTERNAL MEDICINE

## 2021-04-16 PROCEDURE — 99214 OFFICE O/P EST MOD 30 MIN: CPT | Performed by: INTERNAL MEDICINE

## 2021-04-16 PROCEDURE — 1123F ACP DISCUSS/DSCN MKR DOCD: CPT | Performed by: INTERNAL MEDICINE

## 2021-04-16 NOTE — PROGRESS NOTES
Chief Complaint   Patient presents with    3 Month Follow-Up    Hypertension    Coronary Artery Disease    Hyperlipidemia      12/25/2020: Patient is a 72 y.o. male who presents with a chief complaint of syncope. Patient is followed on a regular basis by Dr. Heena Barajas MD.  Patient with past medical sacral disease, status post CABG/PCI, hypertension, hyperlipidemia who presented to [facility] after experiencing a syncopal episode. Patient states he was walking at home, experienced lightheadedness and dizziness and suffered a syncopal episode with facial trauma. He denies any chest pain. Patient states he has been having left lower extremity pain and edema for the past 2 weeks. He does also complain of shortness of breath with very minimal exertion. He was noted to have saddle pulmonary embolism with associated RV strain. Troponins are negative so far. Patient is hemodynamically stable at rest.  Patient with history of lower extremity DVT post cardiac catheterization 1 year ago. He was seen by heme-onc occluded clinic and was taken off of oral anticoagulation this past year. Bilateral extremity venous duplex ultrasound shows left DVT from mid femoral vein extending to the below the knee vessels. Status post echo with normal LV function, dilated right ventricle with decreased function. No significant pulmonary hypertension      1/15/2021: Patient presents for initial medical evaluation. Patient is followed on a regular basis by Dr. Heena Barajas MD.  Status post hospitalization for syncopal episode noted to have bilateral main pulmonary artery pulmonary embolism status post thrombectomy. Status post IVC filter placement. Status post lower extremity venous duplex ultrasound with moderately extensive left extremity DVT from mid left superficial femoral vein. Does have left lower extremity edema. He is breathing much better. He is compliant with his oral anticoagulation.   Status post echo  TRACHEOTOMY  2011    placement with then closure       Social History     Socioeconomic History    Marital status:      Spouse name: None    Number of children: None    Years of education: None    Highest education level: None   Occupational History    None   Social Needs    Financial resource strain: None    Food insecurity     Worry: None     Inability: None    Transportation needs     Medical: None     Non-medical: None   Tobacco Use    Smoking status: Never Smoker    Smokeless tobacco: Never Used   Substance and Sexual Activity    Alcohol use: No    Drug use: No    Sexual activity: None   Lifestyle    Physical activity     Days per week: None     Minutes per session: None    Stress: None   Relationships    Social connections     Talks on phone: None     Gets together: None     Attends Confucianist service: None     Active member of club or organization: None     Attends meetings of clubs or organizations: None     Relationship status: None    Intimate partner violence     Fear of current or ex partner: None     Emotionally abused: None     Physically abused: None     Forced sexual activity: None   Other Topics Concern    None   Social History Narrative    None       Family History   Problem Relation Age of Onset    Diabetes Mother     Heart Disease Father     High Cholesterol Sister     High Cholesterol Brother     Heart Disease Brother        Current Outpatient Medications   Medication Sig Dispense Refill    Elastic Bandages & Supports (JOBST ACTIVE 20-30MMHG MEDIUM) MISC Bilateral thigh-high compression stockings 20 to 30 mmHg.  1 each 1    rivaroxaban (XARELTO) 20 MG TABS tablet Take 1 tablet by mouth daily (with breakfast) Start after the starter pack 30 tablet 5    sacubitril-valsartan (ENTRESTO)  MG per tablet Take by mouth      colchicine (COLCRYS) 0.6 MG tablet Take 0.6 mg by mouth daily      Coenzyme Q10 (COQ-10) 100 MG CAPS Take by mouth daily      Evolocumab no murmurs, rubs, clicks or gallops  Abdomen - soft, nontender, nondistended, no masses or organomegaly  Neurological - alert, oriented, normal speech, no focal findings or movement disorder noted  Extremities - peripheral pulses normal, no pedal edema, no clubbing or cyanosis  Skin - normal coloration and turgor, no rashes, no suspicious skin lesions noted        No orders of the defined types were placed in this encounter. ASSESSMENT:     Diagnosis Orders   1. Hypertension, unspecified type     2. Mixed hyperlipidemia     3. Atherosclerosis of native coronary artery of native heart, angina presence unspecified           PLAN:     Patient will need to continue to follow up with you for their general medical care     As always, aggressive risk factor modification is strongly recommended. We should adhere to the JNC VIII guidelines for HTN management and the NCEP ATP III guidelines for LDL-C management. Cardiac diet is always recommended with low fat, cholesterol, calories and sodium. Continue medications at current doses. Continue with oral anticoagulation. Remove IVCF     Check ECHO with Dr. Juan David Nguyen in future.

## 2021-04-21 ENCOUNTER — HOSPITAL ENCOUNTER (OUTPATIENT)
Dept: CARDIAC CATH/INVASIVE PROCEDURES | Age: 71
Discharge: HOME | End: 2021-04-21
Attending: INTERNAL MEDICINE | Admitting: INTERNAL MEDICINE
Payer: MEDICARE

## 2021-04-21 VITALS
RESPIRATION RATE: 14 BRPM | BODY MASS INDEX: 27.5 KG/M2 | OXYGEN SATURATION: 98 % | HEIGHT: 72 IN | WEIGHT: 203 LBS | SYSTOLIC BLOOD PRESSURE: 113 MMHG | HEART RATE: 59 BPM | DIASTOLIC BLOOD PRESSURE: 78 MMHG | TEMPERATURE: 97 F

## 2021-04-21 DIAGNOSIS — Z95.828 PRESENCE OF IVC FILTER: ICD-10-CM

## 2021-04-21 LAB
ANION GAP SERPL CALCULATED.3IONS-SCNC: 9 MEQ/L (ref 9–15)
BUN BLDV-MCNC: 25 MG/DL (ref 8–23)
CALCIUM SERPL-MCNC: 9.1 MG/DL (ref 8.5–9.9)
CHLORIDE BLD-SCNC: 104 MEQ/L (ref 95–107)
CO2: 25 MEQ/L (ref 20–31)
CREAT SERPL-MCNC: 1.27 MG/DL (ref 0.7–1.2)
GFR AFRICAN AMERICAN: >60
GFR NON-AFRICAN AMERICAN: 56.1
GLUCOSE BLD-MCNC: 95 MG/DL (ref 70–99)
HCT VFR BLD CALC: 50.2 % (ref 42–52)
HEMOGLOBIN: 16.7 G/DL (ref 14–18)
MCH RBC QN AUTO: 30.6 PG (ref 27–31.3)
MCHC RBC AUTO-ENTMCNC: 33.2 % (ref 33–37)
MCV RBC AUTO: 92.3 FL (ref 80–100)
PDW BLD-RTO: 14.2 % (ref 11.5–14.5)
PLATELET # BLD: 132 K/UL (ref 130–400)
POTASSIUM SERPL-SCNC: 4.4 MEQ/L (ref 3.4–4.9)
RBC # BLD: 5.44 M/UL (ref 4.7–6.1)
SODIUM BLD-SCNC: 138 MEQ/L (ref 135–144)
WBC # BLD: 5.2 K/UL (ref 4.8–10.8)

## 2021-04-21 PROCEDURE — 37193 REM ENDOVAS VENA CAVA FILTER: CPT | Performed by: INTERNAL MEDICINE

## 2021-04-21 PROCEDURE — 2580000003 HC RX 258

## 2021-04-21 PROCEDURE — 85027 COMPLETE CBC AUTOMATED: CPT

## 2021-04-21 PROCEDURE — 80048 BASIC METABOLIC PNL TOTAL CA: CPT

## 2021-04-21 PROCEDURE — C1769 GUIDE WIRE: HCPCS

## 2021-04-21 PROCEDURE — C1894 INTRO/SHEATH, NON-LASER: HCPCS

## 2021-04-21 PROCEDURE — C1773 RET DEV, INSERTABLE: HCPCS

## 2021-04-21 PROCEDURE — 2500000003 HC RX 250 WO HCPCS

## 2021-04-21 PROCEDURE — C1887 CATHETER, GUIDING: HCPCS

## 2021-04-21 PROCEDURE — 2709999900 HC NON-CHARGEABLE SUPPLY

## 2021-04-21 PROCEDURE — 6360000002 HC RX W HCPCS

## 2021-04-21 RX ORDER — SODIUM CHLORIDE 9 MG/ML
INJECTION, SOLUTION INTRAVENOUS CONTINUOUS
Status: DISCONTINUED | OUTPATIENT
Start: 2021-04-21 | End: 2021-04-21 | Stop reason: HOSPADM

## 2021-04-21 RX ORDER — HYDRALAZINE HYDROCHLORIDE 20 MG/ML
10 INJECTION INTRAMUSCULAR; INTRAVENOUS EVERY 10 MIN PRN
Status: DISCONTINUED | OUTPATIENT
Start: 2021-04-21 | End: 2021-04-21 | Stop reason: HOSPADM

## 2021-04-21 RX ORDER — ONDANSETRON 2 MG/ML
4 INJECTION INTRAMUSCULAR; INTRAVENOUS EVERY 6 HOURS PRN
Status: DISCONTINUED | OUTPATIENT
Start: 2021-04-21 | End: 2021-04-21 | Stop reason: HOSPADM

## 2021-04-21 RX ORDER — LABETALOL HYDROCHLORIDE 5 MG/ML
10 INJECTION, SOLUTION INTRAVENOUS EVERY 30 MIN PRN
Status: DISCONTINUED | OUTPATIENT
Start: 2021-04-21 | End: 2021-04-21 | Stop reason: HOSPADM

## 2021-04-21 RX ORDER — ACETAMINOPHEN 325 MG/1
650 TABLET ORAL EVERY 4 HOURS PRN
Status: DISCONTINUED | OUTPATIENT
Start: 2021-04-21 | End: 2021-04-21 | Stop reason: HOSPADM

## 2021-04-21 NOTE — PROGRESS NOTES
Pt discharged to home. Discharge instructions reviewed with pt, verbalized understanding. Wife arrived to drive pt home.

## 2021-04-21 NOTE — BRIEF OP NOTE
Section of Cardiology  Adult Brief Procedure Note        Procedure(s):  IVC filter retrieval     Pre-operative Diagnosis:  IVCF present     H&P Status: Completed and reviewed.      Post-operative Diagnosis:      Successful IVCF retrieval     Findings:  See full report    Complications:  none    Primary Proceduralist:   Dr.Wes Vicente DO    Plan  Resume DOAC    Full procedure note to follow

## 2021-04-28 NOTE — PROCEDURES
[address]                      [address], [address] [facility]                                 PROCEDURE NOTE    PATIENT NAME: Olesya Zuleta                   :        1950  MED REC NO:   [id number]                            ROOM:  ACCOUNT NO:   [de-identified]                           ADMIT DATE: 2021  PROVIDER:     Nicolasa Vicente DO    DATE OF PROCEDURE:  2021    PROCEDURE PERFORMED:  IVC filter retrieval.    INDICATIONS:  Presence of IVC filter. PROCEDURE PERFORMED BY:  Toibn Vicente DO    COMPLICATIONS:  None. ACCESS SITE:  Right femoral jugular vein. DESCRIPTION OF PROCEDURE:  The patient was brought to the cardiac cath  lab suite where he was sterilely prepped and draped in the usual  fashion. 1% lidocaine was used to anesthetize the right jugular area  and an 8-Iraqi venous sheath was inserted via modified Seldinger  technique without any difficulty. Next, an 8-Iraqi multipurpose  catheter was brought over the wire to the level of the IVC filter and  utilizing EN Snare loop, the trocar was able to be engaged and pulled  through the multipurpose guiding catheter and IVC filter was removed  from the body. The patient tolerated the procedure well. All catheters and wires were  removed from the patient and the patient was transferred to the  post-cath holding area in stable and satisfactory condition. ASSESSMENT:  Successful IVC filter retrieval.    PLAN:  1. Postprocedure care as usual.  2.  Resume oral intake for patient.         Marai Luz Garner DO    D: 2021 10:23:37       T: 2021 11:18:41     [email or web address]  Job#: [id number]     Doc#: [id number]    CC:

## 2023-05-18 ENCOUNTER — HOSPITAL ENCOUNTER (OUTPATIENT)
Dept: LAB | Age: 73
Discharge: HOME | End: 2023-05-18

## 2023-05-18 LAB
ALBUMIN SERPL-MCNC: 3.9 G/DL (ref 3.5–4.6)
ANION GAP SERPL CALCULATED.3IONS-SCNC: 10 MEQ/L (ref 9–15)
BILIRUB UR QL STRIP: NEGATIVE
BUN SERPL-MCNC: 26 MG/DL (ref 8–23)
CALCIUM SERPL-MCNC: 9 MG/DL (ref 8.5–9.9)
CHLORIDE SERPL-SCNC: 107 MEQ/L (ref 95–107)
CLARITY UR: NORMAL
CO2 SERPL-SCNC: 24 MEQ/L (ref 20–31)
COLOR UR: YELLOW
CREAT SERPL-MCNC: 1.38 MG/DL (ref 0.7–1.2)
CREAT UR-MCNC: 186 MG/DL
GLUCOSE SERPL-MCNC: 114 MG/DL (ref 70–99)
GLUCOSE UR STRIP-MCNC: NEGATIVE MG/DL
HGB UR QL STRIP: NEGATIVE
KETONES UR STRIP-MCNC: NEGATIVE MG/DL
LEUKOCYTE ESTERASE UR QL STRIP: NEGATIVE
NITRITE UR QL STRIP: NEGATIVE
PH UR STRIP: 5.5 [PH] (ref 5–9)
PHOSPHATE SERPL-MCNC: 2.8 MG/DL (ref 2.3–4.8)
POTASSIUM SERPL-SCNC: 4.6 MEQ/L (ref 3.4–4.9)
PROT UR STRIP-MCNC: NEGATIVE MG/DL
PROT UR-MCNC: 11 MG/DL
PROT/CREAT UR-RTO: 0.1 ML/ML
PROT/CREAT UR-RTO: 0.1 ML/ML (ref 0–0.2)
SODIUM SERPL-SCNC: 141 MEQ/L (ref 135–144)
SP GR UR STRIP: 1.02 (ref 1–1.03)
UROBILINOGEN UR STRIP-ACNC: 0.2 E.U./DL

## 2023-05-19 LAB — VITAMIN D 25-HYDROXY: 32 NG/ML

## 2023-05-20 LAB
COMMENT: NORMAL
MISCELLANEOUS LAB TEST RESULT: NORMAL

## 2023-12-30 DIAGNOSIS — E78.5 HYPERLIPIDEMIA, UNSPECIFIED: ICD-10-CM

## 2024-01-05 PROBLEM — I82.401 RIGHT LEG DVT (MULTI): Status: ACTIVE | Noted: 2024-01-05

## 2024-01-05 PROBLEM — R00.1 SINUS BRADYCARDIA: Status: ACTIVE | Noted: 2024-01-05

## 2024-01-05 PROBLEM — Z95.1 S/P CABG (CORONARY ARTERY BYPASS GRAFT): Status: ACTIVE | Noted: 2024-01-05

## 2024-01-05 PROBLEM — I49.3 PVC (PREMATURE VENTRICULAR CONTRACTION): Status: ACTIVE | Noted: 2024-01-05

## 2024-01-05 PROBLEM — M79.606 LEG PAIN: Status: ACTIVE | Noted: 2024-01-05

## 2024-01-05 PROBLEM — E78.5 DYSLIPIDEMIA (HIGH LDL; LOW HDL): Status: ACTIVE | Noted: 2024-01-05

## 2024-01-05 PROBLEM — I26.99 PULMONARY EMBOLISM (MULTI): Status: ACTIVE | Noted: 2024-01-05

## 2024-01-05 PROBLEM — I25.810 CORONARY ARTERY DISEASE INVOLVING CORONARY BYPASS GRAFT OF NATIVE HEART WITHOUT ANGINA PECTORIS: Status: ACTIVE | Noted: 2024-01-05

## 2024-01-05 PROBLEM — N28.9 DECREASED RENAL FUNCTION: Status: ACTIVE | Noted: 2024-01-05

## 2024-01-05 PROBLEM — R56.9 SEIZURE (MULTI): Status: ACTIVE | Noted: 2024-01-05

## 2024-01-05 PROBLEM — R94.31 ABNORMAL HOLTER MONITOR FINDING: Status: ACTIVE | Noted: 2024-01-05

## 2024-01-05 PROBLEM — R20.0 ARM NUMBNESS: Status: ACTIVE | Noted: 2024-01-05

## 2024-01-05 PROBLEM — R42 DIZZINESS: Status: ACTIVE | Noted: 2024-01-05

## 2024-01-05 PROBLEM — I50.22 CHRONIC SYSTOLIC CONGESTIVE HEART FAILURE (MULTI): Status: ACTIVE | Noted: 2024-01-05

## 2024-01-05 PROBLEM — R60.9 EDEMA: Status: ACTIVE | Noted: 2024-01-05

## 2024-01-05 PROBLEM — R06.02 SHORTNESS OF BREATH: Status: ACTIVE | Noted: 2024-01-05

## 2024-01-05 PROBLEM — I25.5 ISCHEMIC CARDIOMYOPATHY: Status: ACTIVE | Noted: 2024-01-05

## 2024-01-05 PROBLEM — I25.2 H/O ACUTE MYOCARDIAL INFARCTION: Status: ACTIVE | Noted: 2024-01-05

## 2024-01-05 PROBLEM — I49.1 PAC (PREMATURE ATRIAL CONTRACTION): Status: ACTIVE | Noted: 2024-01-05

## 2024-01-05 RX ORDER — ACETAMINOPHEN AND DIPHENHYDRAMINE HYDROCHLORIDE 500; 25 MG/1; MG/1
TABLET, FILM COATED ORAL
COMMUNITY

## 2024-01-05 RX ORDER — LOVASTATIN 20 MG/1
20 TABLET ORAL
COMMUNITY

## 2024-01-05 RX ORDER — CARVEDILOL 12.5 MG/1
12.5 TABLET ORAL 2 TIMES DAILY
COMMUNITY

## 2024-01-05 RX ORDER — NITROGLYCERIN 0.4 MG/1
TABLET SUBLINGUAL
COMMUNITY

## 2024-01-05 RX ORDER — EVOLOCUMAB 140 MG/ML
INJECTION, SOLUTION SUBCUTANEOUS
Qty: 6 ML | Refills: 3 | Status: SHIPPED | OUTPATIENT
Start: 2024-01-05 | End: 2024-01-22 | Stop reason: SDUPTHER

## 2024-01-05 RX ORDER — TEMAZEPAM 30 MG/1
CAPSULE ORAL
COMMUNITY

## 2024-01-05 RX ORDER — AMOXICILLIN 500 MG/1
CAPSULE ORAL
COMMUNITY
Start: 2023-03-09

## 2024-01-05 RX ORDER — CLONAZEPAM 2 MG/1
TABLET ORAL
COMMUNITY

## 2024-01-05 RX ORDER — EVOLOCUMAB 140 MG/ML
INJECTION, SOLUTION SUBCUTANEOUS
COMMUNITY
End: 2024-02-28 | Stop reason: SDUPTHER

## 2024-01-05 RX ORDER — RIVAROXABAN 20 MG/1
TABLET, FILM COATED ORAL
COMMUNITY

## 2024-01-05 RX ORDER — ALLOPURINOL 100 MG/1
100 TABLET ORAL DAILY
COMMUNITY

## 2024-01-05 RX ORDER — SACUBITRIL AND VALSARTAN 24; 26 MG/1; MG/1
TABLET, FILM COATED ORAL
COMMUNITY
End: 2024-03-11

## 2024-01-19 ENCOUNTER — TELEPHONE (OUTPATIENT)
Dept: CARDIOLOGY | Facility: CLINIC | Age: 74
End: 2024-01-19

## 2024-01-19 DIAGNOSIS — E78.5 HYPERLIPIDEMIA, UNSPECIFIED: Primary | ICD-10-CM

## 2024-01-19 DIAGNOSIS — I25.810 CORONARY ARTERY DISEASE INVOLVING CORONARY BYPASS GRAFT OF NATIVE HEART WITHOUT ANGINA PECTORIS: ICD-10-CM

## 2024-01-22 RX ORDER — EVOLOCUMAB 140 MG/ML
140 INJECTION, SOLUTION SUBCUTANEOUS
Qty: 2 ML | Refills: 11 | Status: SHIPPED | OUTPATIENT
Start: 2024-01-22 | End: 2025-01-21

## 2024-01-23 ENCOUNTER — TELEPHONE (OUTPATIENT)
Dept: CARDIOLOGY | Facility: CLINIC | Age: 74
End: 2024-01-23

## 2024-01-23 DIAGNOSIS — E78.5 DYSLIPIDEMIA (HIGH LDL; LOW HDL): ICD-10-CM

## 2024-01-23 DIAGNOSIS — I50.22 CHRONIC SYSTOLIC CONGESTIVE HEART FAILURE (MULTI): ICD-10-CM

## 2024-01-23 NOTE — TELEPHONE ENCOUNTER
Pt's wife left message asking for new order for lab work.  Order she has is dated with a to be done date on 6/28/2024.  Pt is scheduled to come in on 6/27/24 for office visit.      New orders placed for Dr. Ramirez to sign.

## 2024-02-28 RX ORDER — EVOLOCUMAB 140 MG/ML
140 INJECTION, SOLUTION SUBCUTANEOUS
Qty: 2 ML | Refills: 11 | Status: SHIPPED | OUTPATIENT
Start: 2024-02-28 | End: 2025-02-27

## 2024-03-11 DIAGNOSIS — I50.22 CHRONIC SYSTOLIC (CONGESTIVE) HEART FAILURE (MULTI): ICD-10-CM

## 2024-03-11 RX ORDER — SACUBITRIL AND VALSARTAN 24; 26 MG/1; MG/1
TABLET, FILM COATED ORAL
Qty: 180 TABLET | Refills: 3 | Status: SHIPPED | OUTPATIENT
Start: 2024-03-11

## 2024-07-01 ENCOUNTER — APPOINTMENT (OUTPATIENT)
Dept: CARDIOLOGY | Facility: CLINIC | Age: 74
End: 2024-07-01

## 2024-07-01 VITALS
BODY MASS INDEX: 25.78 KG/M2 | HEART RATE: 65 BPM | SYSTOLIC BLOOD PRESSURE: 110 MMHG | DIASTOLIC BLOOD PRESSURE: 68 MMHG | HEIGHT: 72 IN | WEIGHT: 190.3 LBS

## 2024-07-01 DIAGNOSIS — E78.5 DYSLIPIDEMIA (HIGH LDL; LOW HDL): ICD-10-CM

## 2024-07-01 DIAGNOSIS — Z95.1 S/P CABG (CORONARY ARTERY BYPASS GRAFT): ICD-10-CM

## 2024-07-01 DIAGNOSIS — Z78.9 NEVER SMOKED CIGARETTES: ICD-10-CM

## 2024-07-01 DIAGNOSIS — I25.810 CORONARY ARTERY DISEASE INVOLVING CORONARY BYPASS GRAFT OF NATIVE HEART WITHOUT ANGINA PECTORIS: ICD-10-CM

## 2024-07-01 DIAGNOSIS — I50.22 CHRONIC SYSTOLIC CONGESTIVE HEART FAILURE (MULTI): ICD-10-CM

## 2024-07-01 DIAGNOSIS — I25.5 ISCHEMIC CARDIOMYOPATHY: ICD-10-CM

## 2024-07-01 PROCEDURE — 99214 OFFICE O/P EST MOD 30 MIN: CPT | Performed by: INTERNAL MEDICINE

## 2024-07-01 PROCEDURE — 1036F TOBACCO NON-USER: CPT | Performed by: INTERNAL MEDICINE

## 2024-07-01 PROCEDURE — 3008F BODY MASS INDEX DOCD: CPT | Performed by: INTERNAL MEDICINE

## 2024-07-01 NOTE — PATIENT INSTRUCTIONS
Continue same medications and treatments.   Patient educated on proper medication use.   Patient educated on risk factor modification.   Please bring any lab results from other providers / physicians to your next appointment.     Please bring all medicines, vitamins, and herbal supplements with you when you come to the office.     Prescriptions will not be filled unless you are compliant with your follow up appointments or have a follow up appointment scheduled as per instruction of your physician. Refills should be requested at the time of your visit.    FOLLOW UP IN 1 YEAR    OBTAIN LAB WORK PRIOR TO YOUR NEXT VISIT, THIS WILL BE FASTING    I, Arielle Bourgeois LPN, am scribing for and in the presence of Dr. Madhav Ramirez, DO, FACC

## 2024-07-01 NOTE — PROGRESS NOTES
CARDIOLOGY OFFICE VISIT      CHIEF COMPLAINT  No chief complaint on file.       HISTORY OF PRESENT ILLNESS  The patient is a 74-year-old  male, with past medical history significant for current thrombosis, pulmonary embolus/left lower extremity DVT 2020, coronary artery  disease, previous inferior wall myocardial infarction, status post coronary artery bypass graft, status post drug-eluting stent to left circumflex, 2010, who presents for followup cardiovascular  care.     Patient denies chest pain, dyspnea, palpitations, nausea, vomiting, dizziness, near-syncope, sree syncope, edema, bleeding. Patient currently drinks 32 ounces of water every other day. No formal exercise program.  Patient does work on his farm.          REVIEW OF SYSTEMS: Negative, noncontributory or as previously   mentioned x12 systems.      PAST SURGICAL HISTORY   Coronary artery bypass graft.  Prostate surgery.   Sternal wire review.   Urethral surgery.   Right nephrectomy secondary to trauma  Thoracic aorta dissection repair 1996  Back surgery     FAMILY HISTORY: Dad  at 66, had a coronary artery bypass graft at 62.  Mom alive at 85 with diabetes.      SOCIAL HISTORY: Denies tobacco. Occasional alcohol use.      ASSESSMENT:   Coronary artery disease, remote inferior wall myocardial infarction, status post coronary artery bypass graft in , status post drug-eluting stent to the left circumflex, on 2010.  Ischemic cardiomyopathy.  Sustained ventricular tachycardia  Second-degree AV block Mobitz type II  Congestive heart failure, chronic systolic failure, New York Heart Association class II.  Status post thoracic aortic dissection repair in 1996.  Dyslipidemia.  Peripheral neuropathy  Seizure disorder.  Renal insufficiency- Dr Prieto  History of right nephrectomy secondary to trauma  Pulmonary embolus/LLE DVT 20/Right lower extremity DVT 19  History of venous  cerebral thrombus post spinal anesthesia 2004  History of Hypercoagulable state- hematology [facility]  Pulmonary nodule/mass  Possible intolerance to Niaspan-rash  Intolerance Entresto 97/103 twice daily due to symptomatic hypotension  Intolerance of Coreg 25 mg twice daily due to symptomatic bradycardia     RECOMMENDATIONS:  Patient appears to be stable from a cardiac standpoint. No symptoms of angina. No evidence of heart failure. No symptomatic arrhythmia. No symptomatic bradycardia. I encouraged patient increase his water intake to 64 ounces per day. I encourage patient to take his statin on a daily basis. Increase activity as tolerated. Follow-up with myself in 1 year. Check CMP, lipid profile at that time.     Please excuse any errors in grammar or translation related to this dictation. Voice recognition software was utilized to prepare this document.     Recent Cardiovascular Testing:  The following results have been reviewed and updated.     24 Hour Holter 6/20/23  1. NSR 48-87, average 65 bpm  2. Frequent PVC's (10.7%)  3. Rare PAC's     Stress ECHO: 3/11/08  1. Negative.  2. EF 50%.     Echo 6/8/21  1.Mild TR  2.EF 50-55 %     Labs: 6/20/24  , , HDL 34, LDL 80     Zio Patch 7/14/2021.  1.NSVT x 4 beats at 113-126 bmp  2.NSR with intermittent second degree AV block  3.Sinus Bradycardia 43 bpm        MRA chest and thoracic aorta: 3/25/09  1. No evidence of dissection.     Vascular CTA: 10/11/13  1. Intact surgical repair of proximal ascending aorta.     Rt lower ext duplex: 12/9/19  1. Occlusive thrombosis of the right common femoral, femoral, popiteal, and posterior tibial veins.     Cath: 12/2/19  1. Ostial LAD: 50%  2. Mid LAD: 50%.  3. Circumflex patent stents.  4. OM1 Cx: 100%.  5. Proximal RCA: 100%  6. SVG to the 2nd Marginal: 100%  7. SVG to the PDA: 100%.  8. SVG to the 3rd Marginal; 100%.  9. EF 45%.     MPL: 6/20/23  1. EF 51%  2. No ischemia.  3. Severe inferior/inferolateral myocardial  infarction by perfusion imaging    VITALS  Vitals:    07/01/24 1315   BP: 110/68   Pulse: 65     Wt Readings from Last 4 Encounters:   06/28/23 90.3 kg (199 lb)   08/03/22 93.2 kg (205 lb 6.4 oz)   06/15/22 93.3 kg (205 lb 11.2 oz)   12/23/21 93.6 kg (206 lb 4 oz)       PHYSICAL EXAM:  GENERAL:  Well developed, well nourished, in no acute distress.  CHEST:  Symmetric and nontender.  NEURO/PSYCH:  Alert and oriented times three with approppriate behavior and responses.  NECK:  Supple, no JVD, no bruit.  LUNGS:  Clear to auscultation bilaterally, normal respiratory effort.  HEART:  Rate and rhythm regular with no evident murmur, no gallop appreciated.                   There are no rubs, clicks or heaves.  EXTREMITIES:  Warm with good color, no clubbing or cyanosis.  There is trace bilateral pretibial edema noted.  PERIPHERAL VASCULAR:  Pulses present and equally palpable; 2+ throughout.    ASSESSMENT AND PLAN  Diagnoses and all orders for this visit:  Coronary artery disease involving coronary bypass graft of native heart without angina pectoris  Ischemic cardiomyopathy  Chronic systolic congestive heart failure (Multi)  S/P CABG (coronary artery bypass graft)  Dyslipidemia (high LDL; low HDL)  BMI 25.0-25.9,adult  Never smoked cigarettes      Past Medical History  No past medical history on file.    Social History  Social History     Tobacco Use    Smoking status: Never    Smokeless tobacco: Never   Substance Use Topics    Alcohol use: Never    Drug use: Never       Family History     Family History   Problem Relation Name Age of Onset    Diabetes Mother      Other (CABG) Father  62        Allergies:  Allergies   Allergen Reactions    Atorvastatin Myalgia    Ezetimibe-Simvastatin Myalgia    Pravastatin Myalgia    Rosuvastatin Myalgia    Simvastatin Myalgia        Outpatient Medications:  Current Outpatient Medications   Medication Instructions    allopurinol (ZYLOPRIM) 100 mg, oral, Daily    amoxicillin (Amoxil) 500  mg capsule TAKE 4 CAPSULES BY ORAL ROUTE ONE HOUR PRIOR TO DENTAL APPOINTMENT    carvedilol (COREG) 12.5 mg, oral, 2 times daily    clonazePAM (KlonoPIN) 2 mg tablet TAKE 1 TABLET BY MOUTH EVERY DAY AT THE SAME TIME EACH DAY    diphenhydrAMINE-acetaminophen (Tylenol PM Extra Strength)  mg per tablet TAKE 2 TABLETs PO AT BEDTIME.    Entresto 24-26 mg tablet TAKE 1 TABLET TWICE A DAY. IF SYSTOLIC BP IS LESS THAN 100 HOLD MEDICATION.    lovastatin (MEVACOR) 20 mg, oral, Daily before breakfast    nitroglycerin (Nitrostat) 0.4 mg SL tablet PLACE 1 TABLET UNDER THE TONGUE EVERY 5 MINUTES UP TO 3 DOSES AS NEEDED FOR CHEST PAIN.    Repatha SureClick 140 mg, subcutaneous, Every 14 days    Repatha SureClick 140 mg, subcutaneous, Every 14 days    temazepam (Restoril) 30 mg capsule TAKE 1 CAPSULE BY MOUTH EVERY OTHER DAY AS NEEDED, ALTERNATING WITH TYLENOL PM    ubidecarenone (COENZYME Q10, BULK, MISC) TAKE 1 CAPSULE PO DAILY WITH A MEAL.    Xarelto 20 mg tablet TAKE 1 TABLET EYE EVERY DAY        Recent Lab Results:    CMP:    Lab Results   Component Value Date     07/09/2022    K 3.8 07/09/2022     (H) 07/09/2022    CO2 23 07/09/2022    BUN 18 07/09/2022    CREATININE 1.30 07/09/2022    GLUCOSE 87 07/09/2022    CALCIUM 8.2 (L) 07/09/2022       Magnesium:    Lab Results   Component Value Date    MG 2.20 07/08/2022       Lipid Profile:    Lab Results   Component Value Date    TRIG 110 06/03/2021    HDL 34.0 (A) 06/03/2021       TSH:    Lab Results   Component Value Date    TSH 1.64 07/08/2022       BNP:   Lab Results   Component Value Date     (H) 07/08/2022        PT/INR:    Lab Results   Component Value Date    PROTIME 17.6 (H) 07/08/2022    INR 1.5 (H) 07/08/2022       HgBA1c:    Lab Results   Component Value Date    HGBA1C 5.0 11/30/2019       BMP:  Lab Results   Component Value Date     07/09/2022     07/08/2022     06/03/2021    K 3.8 07/09/2022    K 4.3 07/08/2022    K 4.6  06/03/2021     (H) 07/09/2022     (H) 07/08/2022     06/03/2021    CO2 23 07/09/2022    CO2 23 07/08/2022    CO2 25 06/03/2021    BUN 18 07/09/2022    BUN 29 (H) 07/08/2022    BUN 31 (H) 06/03/2021    CREATININE 1.30 07/09/2022    CREATININE 1.54 (H) 07/08/2022    CREATININE 1.51 (H) 06/03/2021       CBC:  Lab Results   Component Value Date    WBC 7.0 07/08/2022    WBC 7.5 11/30/2019    WBC 8.0 11/29/2019    RBC 5.44 07/08/2022    RBC 4.59 11/30/2019    RBC 4.94 11/29/2019    HGB 16.7 07/08/2022    HGB 14.6 11/30/2019    HGB 15.8 11/29/2019    HCT 49.9 07/08/2022    HCT 44.2 11/30/2019    HCT 46.7 11/29/2019    MCV 92 07/08/2022    MCV 96 11/30/2019    MCV 95 11/29/2019    MCHC 33.5 07/08/2022    MCHC 33.0 11/30/2019    MCHC 33.8 11/29/2019    RDW 12.7 07/08/2022    RDW 12.8 11/30/2019    RDW 12.8 11/29/2019     (L) 07/08/2022    PLT 90 (L) 11/30/2019    PLT 89 (L) 11/29/2019       Cardiac Enzymes:    Lab Results   Component Value Date    TROPHS 7 07/08/2022    TROPHS 7 07/08/2022    TROPHS 7 07/08/2022       Hepatic Function Panel:    Lab Results   Component Value Date    ALKPHOS 54 06/03/2021    ALT 15 06/03/2021    AST 18 06/03/2021    PROT 7.3 06/03/2021    BILITOT 0.6 06/03/2021           Madhav Ramirez DO

## 2024-07-02 ENCOUNTER — APPOINTMENT (OUTPATIENT)
Dept: CT IMAGING | Age: 74
End: 2024-07-02
Payer: COMMERCIAL

## 2024-07-02 ENCOUNTER — HOSPITAL ENCOUNTER (EMERGENCY)
Age: 74
Discharge: HOME | End: 2024-07-02
Attending: STUDENT IN AN ORGANIZED HEALTH CARE EDUCATION/TRAINING PROGRAM
Payer: COMMERCIAL

## 2024-07-02 ENCOUNTER — APPOINTMENT (OUTPATIENT)
Dept: GENERAL RADIOLOGY | Age: 74
End: 2024-07-02
Payer: COMMERCIAL

## 2024-07-02 VITALS
RESPIRATION RATE: 11 BRPM | DIASTOLIC BLOOD PRESSURE: 87 MMHG | SYSTOLIC BLOOD PRESSURE: 107 MMHG | OXYGEN SATURATION: 96 % | TEMPERATURE: 98.4 F | HEIGHT: 72 IN | WEIGHT: 203 LBS | BODY MASS INDEX: 27.5 KG/M2 | HEART RATE: 82 BPM

## 2024-07-02 DIAGNOSIS — S01.81XA FACIAL LACERATION, INITIAL ENCOUNTER: ICD-10-CM
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
## 2024-07-02 DIAGNOSIS — S40.812A ABRASION OF LEFT UPPER EXTREMITY, INITIAL ENCOUNTER: ICD-10-CM

## 2024-07-02 DIAGNOSIS — E04.9 ENLARGED THYROID: Primary | ICD-10-CM

## 2024-07-02 LAB
ABO + RH BLD: NORMAL
ALBUMIN SERPL-MCNC: 4.2 G/DL (ref 3.5–4.6)
ALP SERPL-CCNC: 56 U/L (ref 35–104)
ALT SERPL-CCNC: 26 U/L (ref 0–41)
ANION GAP SERPL CALCULATED.3IONS-SCNC: 12 MEQ/L (ref 9–15)
APTT PPP: 30.9 SEC (ref 24.4–36.8)
AST SERPL-CCNC: 27 U/L (ref 0–40)
BACTERIA URNS QL MICRO: NEGATIVE /HPF
BASOPHILS # BLD: 0 K/UL (ref 0–0.2)
BASOPHILS NFR BLD: 0.6 %
BILIRUB SERPL-MCNC: 0.3 MG/DL (ref 0.2–0.7)
BILIRUB UR QL STRIP: NEGATIVE
BLD GP AB SCN SERPL QL: NORMAL
BLOOD GROUP ANTIBODIES SERPL: NORMAL
BUN SERPL-MCNC: 31 MG/DL (ref 8–23)
CALCIUM SERPL-MCNC: 8.8 MG/DL (ref 8.5–9.9)
CHLORIDE SERPL-SCNC: 107 MEQ/L (ref 95–107)
CLARITY UR: CLEAR
CO2 SERPL-SCNC: 21 MEQ/L (ref 20–31)
COLOR UR: YELLOW
CREAT SERPL-MCNC: 1.38 MG/DL (ref 0.7–1.2)
EOSINOPHIL # BLD: 0.3 K/UL (ref 0–0.7)
EOSINOPHIL NFR BLD: 3.8 %
EPI CELLS #/AREA URNS AUTO: NORMAL /HPF (ref 0–5)
ERYTHROCYTE [DISTWIDTH] IN BLOOD BY AUTOMATED COUNT: 13.5 % (ref 11.5–14.5)
ETHANOL PERCENT: NORMAL G/DL
ETHANOLAMINE SERPL-MCNC: <10 MG/DL (ref 0–0.08)
GLOBULIN SER CALC-MCNC: 2.6 G/DL (ref 2.3–3.5)
GLUCOSE SERPL-MCNC: 92 MG/DL (ref 70–99)
GLUCOSE UR STRIP-MCNC: NEGATIVE MG/DL
HCT VFR BLD AUTO: 48.8 % (ref 42–52)
HGB BLD-MCNC: 16.6 G/DL (ref 14–18)
HGB UR QL STRIP: ABNORMAL
HYALINE CASTS #/AREA URNS AUTO: NORMAL /HPF (ref 0–5)
INR PPP: 1.6
KETONES UR STRIP-MCNC: NEGATIVE MG/DL
LEUKOCYTE ESTERASE UR QL STRIP: NEGATIVE
LYMPHOCYTES # BLD: 1.5 K/UL (ref 1–4.8)
LYMPHOCYTES NFR BLD: 20.3 %
MCH RBC QN AUTO: 31.1 PG (ref 27–31.3)
MCHC RBC AUTO-ENTMCNC: 34 % (ref 33–37)
MCV RBC AUTO: 91.4 FL (ref 79–92.2)
MONOCYTES # BLD: 0.6 K/UL (ref 0.2–0.8)
MONOCYTES NFR BLD: 8.1 %
NEUTROPHILS # BLD: 4.8 K/UL (ref 1.4–6.5)
NEUTS SEG NFR BLD: 66.9 %
NITRITE UR QL STRIP: NEGATIVE
PH UR STRIP: 5.5 [PH] (ref 5–9)
PLATELET # BLD AUTO: 158 K/UL (ref 130–400)
POTASSIUM SERPL-SCNC: 4.5 MEQ/L (ref 3.4–4.9)
PROT SERPL-MCNC: 6.8 G/DL (ref 6.3–8)
PROT UR STRIP-MCNC: NEGATIVE MG/DL
PROTHROMBIN TIME: 19.2 SEC (ref 12.3–14.9)
RBC # BLD AUTO: 5.34 M/UL (ref 4.7–6.1)
RBC #/AREA URNS AUTO: NORMAL /HPF (ref 0–5)
SODIUM SERPL-SCNC: 140 MEQ/L (ref 135–144)
SP GR UR STRIP: 1.02 (ref 1–1.03)
URINE REFLEX TO CULTURE: ABNORMAL
UROBILINOGEN UR STRIP-ACNC: 0.2 E.U./DL
WBC # BLD AUTO: 7.2 K/UL (ref 4.8–10.8)
WBC #/AREA URNS AUTO: NORMAL /HPF (ref 0–5)

## 2024-07-02 PROCEDURE — 99285 EMERGENCY DEPT VISIT HI MDM: CPT

## 2024-07-02 PROCEDURE — 72125 CT NECK SPINE W/O DYE: CPT

## 2024-07-02 PROCEDURE — 86870 RBC ANTIBODY IDENTIFICATION: CPT

## 2024-07-02 PROCEDURE — 6360000004 HC RX CONTRAST MEDICATION

## 2024-07-02 PROCEDURE — 72128 CT CHEST SPINE W/O DYE: CPT

## 2024-07-02 PROCEDURE — 73060 X-RAY EXAM OF HUMERUS: CPT

## 2024-07-02 PROCEDURE — 74177 CT ABD & PELVIS W/CONTRAST: CPT

## 2024-07-02 PROCEDURE — 86900 BLOOD TYPING SEROLOGIC ABO: CPT

## 2024-07-02 PROCEDURE — 6360000002 HC RX W HCPCS: Performed by: STUDENT IN AN ORGANIZED HEALTH CARE EDUCATION/TRAINING PROGRAM

## 2024-07-02 PROCEDURE — 82077 ASSAY SPEC XCP UR&BREATH IA: CPT

## 2024-07-02 PROCEDURE — 2580000003 HC RX 258: Performed by: STUDENT IN AN ORGANIZED HEALTH CARE EDUCATION/TRAINING PROGRAM

## 2024-07-02 PROCEDURE — 71260 CT THORAX DX C+: CPT

## 2024-07-02 PROCEDURE — 86901 BLOOD TYPING SEROLOGIC RH(D): CPT

## 2024-07-02 PROCEDURE — 6370000000 HC RX 637 (ALT 250 FOR IP): Performed by: STUDENT IN AN ORGANIZED HEALTH CARE EDUCATION/TRAINING PROGRAM

## 2024-07-02 PROCEDURE — 81001 URINALYSIS AUTO W/SCOPE: CPT

## 2024-07-02 PROCEDURE — 80053 COMPREHEN METABOLIC PANEL: CPT

## 2024-07-02 PROCEDURE — 85025 COMPLETE CBC W/AUTO DIFF WBC: CPT

## 2024-07-02 PROCEDURE — 72131 CT LUMBAR SPINE W/O DYE: CPT

## 2024-07-02 PROCEDURE — 86850 RBC ANTIBODY SCREEN: CPT

## 2024-07-02 PROCEDURE — 85610 PROTHROMBIN TIME: CPT

## 2024-07-02 PROCEDURE — 36415 COLL VENOUS BLD VENIPUNCTURE: CPT

## 2024-07-02 PROCEDURE — 12011 RPR F/E/E/N/L/M 2.5 CM/<: CPT

## 2024-07-02 PROCEDURE — 85730 THROMBOPLASTIN TIME PARTIAL: CPT

## 2024-07-02 PROCEDURE — 90715 TDAP VACCINE 7 YRS/> IM: CPT | Performed by: STUDENT IN AN ORGANIZED HEALTH CARE EDUCATION/TRAINING PROGRAM

## 2024-07-02 PROCEDURE — 90471 IMMUNIZATION ADMIN: CPT | Performed by: STUDENT IN AN ORGANIZED HEALTH CARE EDUCATION/TRAINING PROGRAM

## 2024-07-02 PROCEDURE — 70450 CT HEAD/BRAIN W/O DYE: CPT

## 2024-07-02 PROCEDURE — 70486 CT MAXILLOFACIAL W/O DYE: CPT

## 2024-07-02 PROCEDURE — 6830039000 HC L3 TRAUMA ALERT

## 2024-07-02 RX ORDER — CYCLOBENZAPRINE HCL 5 MG
5 TABLET ORAL 2 TIMES DAILY PRN
Qty: 15 TABLET | Refills: 0 | Status: SHIPPED | OUTPATIENT
Start: 2024-07-02 | End: 2024-07-12

## 2024-07-02 RX ORDER — ONDANSETRON 4 MG/1
4 TABLET, ORALLY DISINTEGRATING ORAL ONCE
Status: COMPLETED | OUTPATIENT
Start: 2024-07-02 | End: 2024-07-02

## 2024-07-02 RX ORDER — 0.9 % SODIUM CHLORIDE 0.9 %
1000 INTRAVENOUS SOLUTION INTRAVENOUS ONCE
Status: COMPLETED | OUTPATIENT
Start: 2024-07-02 | End: 2024-07-02

## 2024-07-02 RX ORDER — HYDROCODONE BITARTRATE AND ACETAMINOPHEN 5; 325 MG/1; MG/1
1 TABLET ORAL ONCE
Status: COMPLETED | OUTPATIENT
Start: 2024-07-02 | End: 2024-07-02

## 2024-07-02 RX ADMIN — HYDROCODONE BITARTRATE AND ACETAMINOPHEN 1 TABLET: 5; 325 TABLET ORAL at 17:58

## 2024-07-02 RX ADMIN — SODIUM CHLORIDE 1000 ML: 9 INJECTION, SOLUTION INTRAVENOUS at 17:10

## 2024-07-02 RX ADMIN — TETANUS TOXOID, REDUCED DIPHTHERIA TOXOID AND ACELLULAR PERTUSSIS VACCINE, ADSORBED 0.5 ML: 5; 2.5; 8; 8; 2.5 SUSPENSION INTRAMUSCULAR at 17:12

## 2024-07-02 RX ADMIN — ONDANSETRON 4 MG: 4 TABLET, ORALLY DISINTEGRATING ORAL at 17:58

## 2024-07-02 RX ADMIN — IOPAMIDOL 75 ML: 755 INJECTION, SOLUTION INTRAVENOUS at 16:08

## 2024-07-02 ASSESSMENT — PAIN DESCRIPTION - PAIN TYPE: TYPE: ACUTE PAIN

## 2024-07-02 ASSESSMENT — PAIN DESCRIPTION - LOCATION
LOCATION: CHEST;BACK;ARM
LOCATION: GENERALIZED

## 2024-07-02 ASSESSMENT — PAIN SCALES - GENERAL: PAINLEVEL_OUTOF10: 5

## 2024-07-02 ASSESSMENT — LIFESTYLE VARIABLES
HOW OFTEN DO YOU HAVE A DRINK CONTAINING ALCOHOL: MONTHLY OR LESS
HOW MANY STANDARD DRINKS CONTAINING ALCOHOL DO YOU HAVE ON A TYPICAL DAY: 1 OR 2

## 2024-07-02 ASSESSMENT — PAIN DESCRIPTION - FREQUENCY: FREQUENCY: CONTINUOUS

## 2024-07-02 ASSESSMENT — PAIN DESCRIPTION - ORIENTATION: ORIENTATION: LEFT

## 2024-07-02 ASSESSMENT — PAIN - FUNCTIONAL ASSESSMENT: PAIN_FUNCTIONAL_ASSESSMENT: 0-10

## 2024-07-02 ASSESSMENT — PAIN DESCRIPTION - ONSET: ONSET: ON-GOING

## 2024-07-02 NOTE — ED NOTES
Patient medicated per orders. Patient updated on plan of care and pending CT results. No outward signs of acute distress noted at this time.

## 2024-07-02 NOTE — ED PROVIDER NOTES
complications,  Patient/Guardian requesting discharge. Patient/Guardian was given written and verbal instructions prior to discharge. Patient/Guardian understood and agreed. Patient/Guardian had no further questions.       RADIOLOGY:  XR HUMERUS LEFT (MIN 2 VIEWS)   Final Result   No acute osseous abnormality.         CT HEAD WO CONTRAST   Final Result   No acute intracranial abnormality.         CT CERVICAL SPINE WO CONTRAST   Final Result   No acute abnormality of the cervical spine.         CT FACIAL BONES WO CONTRAST   Final Result   No acute facial bone trauma.         CT CHEST W CONTRAST   Final Result   1. No acute traumatic abnormality detected in the chest.   2. Enlargement of the left lobe of the thyroid gland with mild heterogeneity.   Nonemergent thyroid ultrasound recommended.         CT ABDOMEN PELVIS W IV CONTRAST Additional Contrast? None   Final Result   1. No acute traumatic injury to the abdomen or pelvis.   2. 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   3. Possible low-density cholelithiasis.   4. Status post right nephrectomy.   5. Status post prostatectomy.         CT THORACIC SPINE WO CONTRAST   Final Result   1. No acute fracture or malalignment of the thoracic spine.   2. Osteopenia.   3. Degenerative disc disease in the lower cervical and upper thoracic spine.   4. DI SH in the mid and lower thoracic spine, unchanged from 12/24/2020.         CT LUMBAR SPINE WO CONTRAST   Final Result   Unremarkable non-contrast CT of the lumbar spine.               EKG  See MDM for my interpretation     All EKG's are interpreted by the Emergency Department Physician who either signs or Co-signs this chart in the absence of a cardiologist.      PROCEDURES:  Laceration Repair Procedure Note    Performed by: Tae French DO, D.O.    Indication: Laceration    Consent: The patient provided verbal consent for this procedure.    Time out

## 2024-07-02 NOTE — DISCHARGE INSTRUCTIONS
Call primary doctor to follow-up for further testing for your thyroid as needed based on the imaging today showing an enlarged thyroid    For pain use acetaminophen (Tylenol) or ibuprofen (Motrin / Advil), unless prescribed medications that have acetaminophen or ibuprofen (or similar medications) in it.  You can take over the counter acetaminophen tablets (1 - 2 tablets of the 500-mg strength every 6 hours) or ibuprofen tablets (2 tablets every 4 hours).    Soak in a hot shower or bath tub.  You will have more aches and pains tomorrow, but should feel better in several days.    PLEASE RETURN TO THE EMERGENCY DEPARTMENT IMMEDIATELY for worsening of pain, decrease sensation to arms or legs, inability to move arms or legs, shortness of breath, severe chest pain, excessive nausea or vomiting, notice any bruising to your abdomen or have increase in abdominal pain, or if you develop any concerning symptoms such as: high fever not relieved by acetaminophen (Tylenol) and/or ibuprofen (Motrin / Advil), chills, feeling of your heart fluttering or racing, persistent nausea and/or vomiting, vomiting up blood, blood in your stool, loss of consciousness, numbness, weakness or tingling in the arms or legs or change in color of the extremities, changes in mental status, persistent headache, blurry vision, loss of bladder / bowel control, unable to follow up with your physician, or other any other care or concern.

## 2024-07-02 NOTE — ED NOTES
DC instructions explained and reviewed. Pt demonstrates understanding. No outward signs of acute distress noted. Patient discharged home with significant other.      You can access the FollowMyHealth Patient Portal offered by [facility] by registering at the following website: [email or web address]. By joining [facility]’s FollowMyHealth portal, you will also be able to view your health information using other applications (apps) compatible with our system.

## 2024-07-02 NOTE — H&P
no wheezes, no rales  Cardiovascular:    Pulses: Bilateral radial, DP and PT pulses are normal;  Abdomen: Appearance: Non-distended, No scars, lacerations, contusions; right flank tenderness   Rectal: No gross blood noted  Pelvis/Perineum: Pelvis is stable to palpation;  Musculoskeletal:    Back/Spine: right lateral mid-back tenderness ; no step off or deformity;   Extremities: Ecchymoses over left bicep with mild TTP. Superficial abrasion to right anterior shin and dorsal aspect left hand. Non-tender to these areas. Otherwise, No gross upper or lower extremity signs of trauma;    PAST MEDICAL HISTORY:  Past Medical History:   Diagnosis Date    CAD (coronary artery disease)     CABG X 4 / Aortic patch / 2010 2 cardiac stents    Cancer (HCC)     prostate with OR    History of deep venous thrombosis (DVT) of distal vein of left lower extremity 1/15/2021    Hx of blood clots 2002    brain venous thrombosis / had been off aspirin for 1 week & had a seizure    Hyperlipidemia     meds since 1996    MI (myocardial infarction) (HCC) 1996     CABG x 4 with aortic patch / 2010 2 cardiac stents       PAST SURGICAL HISTORY:  Past Surgical History:   Procedure Laterality Date    ABDOMEN SURGERY      large wound revision with mesh / due to non healing wound    CARDIAC SURGERY      CABG x 4 / aortic patch / 2 cardiac stents    COLONOSCOPY      HERNIA REPAIR      bilateral inguinal & umbilical hernia repairs    OTHER SURGICAL HISTORY      due to radical prostatectomy /artificial bladder sphincter    PATELLA FRACTURE SURGERY Right 1979    CT LAMINECTOMY W/O FFD > 2 VERT SEG LUMBAR N/A 2/14/2018    EXTRAFORAMINAL L 3-4 MICRODISKECTOMY, RIGHT  RIGHT L 3-4 LAMINECTOMY performed by Guanaco Kaur MD at [facility] OR    PROSTATE SURGERY  2000    radical prostatectomy due to cancer / no trx to follow    TRACHEOTOMY  2011    placement with then closure       PRE-ADMISSION MEDICATIONS:   Prior to Admission medications    Medication Sig Start
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
## 2024-07-03 LAB
PERFORMED ON: ABNORMAL
POC CREATININE: 1.6 MG/DL (ref 0.8–1.3)
POC SAMPLE TYPE: ABNORMAL

## 2025-01-14 ENCOUNTER — TELEPHONE (OUTPATIENT)
Dept: CARDIOLOGY | Facility: CLINIC | Age: 75
End: 2025-01-14
Payer: MEDICARE

## 2025-01-14 NOTE — TELEPHONE ENCOUNTER
PA initiated through [facility]     PA request has been converted to a general form because of: <Cannot find matching patient

## 2025-02-22 DIAGNOSIS — I50.22 CHRONIC SYSTOLIC (CONGESTIVE) HEART FAILURE: ICD-10-CM

## 2025-02-24 RX ORDER — SACUBITRIL AND VALSARTAN 24; 26 MG/1; MG/1
1 TABLET, FILM COATED ORAL 2 TIMES DAILY
Qty: 180 TABLET | Refills: 3 | Status: SHIPPED | OUTPATIENT
Start: 2025-02-24

## 2025-02-24 NOTE — TELEPHONE ENCOUNTER
Received request for prescription refills for patient.   Patient follows with Dr. Ramirez    Request is for Entresto  Is patient currently on medication yes    Last OV 7/1/2024  Next OV 7/2/2025    Pended for signing and sent to provider

## 2025-03-18 DIAGNOSIS — I25.810 CORONARY ARTERY DISEASE INVOLVING CORONARY BYPASS GRAFT OF NATIVE HEART WITHOUT ANGINA PECTORIS: ICD-10-CM

## 2025-03-18 DIAGNOSIS — E78.5 HYPERLIPIDEMIA, UNSPECIFIED: ICD-10-CM

## 2025-03-21 RX ORDER — EVOLOCUMAB 140 MG/ML
INJECTION, SOLUTION SUBCUTANEOUS
Qty: 6 EACH | Refills: 3 | Status: SHIPPED | OUTPATIENT
Start: 2025-03-21

## 2025-03-21 NOTE — TELEPHONE ENCOUNTER
Received request for prescription refills for patient.   Patient follows with Ashley    Request is for Repatha  Is patient currently on medication yes    Last OV 07/01/2024  Next OV 07/02/2025    Pended for signing and sent to provider

## 2025-07-02 ENCOUNTER — APPOINTMENT (OUTPATIENT)
Dept: CARDIOLOGY | Facility: CLINIC | Age: 75
End: 2025-07-02
Payer: MEDICARE

## 2025-07-02 VITALS
SYSTOLIC BLOOD PRESSURE: 108 MMHG | HEIGHT: 70 IN | BODY MASS INDEX: 26.28 KG/M2 | HEART RATE: 62 BPM | DIASTOLIC BLOOD PRESSURE: 68 MMHG | WEIGHT: 183.6 LBS

## 2025-07-02 DIAGNOSIS — I50.22 CHRONIC SYSTOLIC CONGESTIVE HEART FAILURE: ICD-10-CM

## 2025-07-02 DIAGNOSIS — Z78.9 NEVER SMOKED CIGARETTES: ICD-10-CM

## 2025-07-02 DIAGNOSIS — I25.5 ISCHEMIC CARDIOMYOPATHY: ICD-10-CM

## 2025-07-02 DIAGNOSIS — N28.9 RENAL INSUFFICIENCY: ICD-10-CM

## 2025-07-02 DIAGNOSIS — Z98.890 S/P AORTIC DISSECTION REPAIR: ICD-10-CM

## 2025-07-02 DIAGNOSIS — I47.29 NSVT (NONSUSTAINED VENTRICULAR TACHYCARDIA) (MULTI): ICD-10-CM

## 2025-07-02 DIAGNOSIS — I25.810 CORONARY ARTERY DISEASE INVOLVING CORONARY BYPASS GRAFT OF NATIVE HEART WITHOUT ANGINA PECTORIS: ICD-10-CM

## 2025-07-02 DIAGNOSIS — E78.5 DYSLIPIDEMIA (HIGH LDL; LOW HDL): ICD-10-CM

## 2025-07-02 PROCEDURE — 99214 OFFICE O/P EST MOD 30 MIN: CPT | Performed by: INTERNAL MEDICINE

## 2025-07-02 PROCEDURE — 1126F AMNT PAIN NOTED NONE PRSNT: CPT | Performed by: INTERNAL MEDICINE

## 2025-07-02 PROCEDURE — 1036F TOBACCO NON-USER: CPT | Performed by: INTERNAL MEDICINE

## 2025-07-02 PROCEDURE — 1159F MED LIST DOCD IN RCRD: CPT | Performed by: INTERNAL MEDICINE

## 2025-07-02 PROCEDURE — 3008F BODY MASS INDEX DOCD: CPT | Performed by: INTERNAL MEDICINE

## 2025-07-02 ASSESSMENT — PAIN SCALES - GENERAL: PAINLEVEL_OUTOF10: 0-NO PAIN

## 2025-07-02 NOTE — PATIENT INSTRUCTIONS
Continue same medications and treatments.   Patient educated on proper medication use.   Patient educated on risk factor modification.   Please bring any lab results from other providers / physicians to your next appointment.     Please bring all medicines, vitamins, and herbal supplements with you when you come to the office.     Prescriptions will not be filled unless you are compliant with your follow up appointments or have a follow up appointment scheduled as per instruction of your physician. Refills should be requested at the time of your visit.    STRESS TEST (MPX) IN 1 YEAR    OBTAIN FASTING LAB WORK IN 1 YEAR PRIOR TO YOUR OFFICE VISIT    FOLLOW UP AFTER TESTING    IArielle LPN, am scribing for and in the presence of Dr. Madhav Ramirez, DO, FACC

## 2025-07-02 NOTE — PROGRESS NOTES
CARDIOLOGY OFFICE VISIT      CHIEF COMPLAINT  Chief Complaint   Patient presents with    Follow-up     1 year cardiac follow up for CAD/PCI, CABG, Thoracic aortic dissection.         HISTORY OF PRESENT ILLNESS  The patient is a 75-year-old  male, with past medical history significant for current thrombosis, pulmonary embolus/left lower extremity DVT 2020, coronary artery disease, previous inferior wall myocardial infarction, status post coronary artery bypass graft, status post drug-eluting stent to left circumflex, 2010, who presents for followup cardiovascular  care.     Patient denies chest pain, dyspnea, palpitations, nausea, vomiting, dizziness, fever, chill, bleeding.  No formal exercise program.  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           REVIEW OF SYSTEMS: Negative, noncontributory or as previously   mentioned x12 systems.      PAST SURGICAL HISTORY   Coronary artery bypass graft.  Prostate surgery.   Sternal wire review.   Urethral surgery.   Right nephrectomy secondary to trauma  Thoracic aorta dissection repair 1996  Back surgery     FAMILY HISTORY: Dad  at 66, had a coronary artery bypass graft at 62.  Mom alive at 85 with diabetes.      SOCIAL HISTORY: Denies tobacco. Occasional alcohol use.      ASSESSMENT:   Coronary artery disease, remote inferior wall myocardial infarction, status post coronary artery bypass graft in , status post drug-eluting stent to the left circumflex, on 2010.  Ischemic cardiomyopathy.  Sustained ventricular tachycardia  Second-degree AV block Mobitz type II  Congestive heart failure, chronic systolic failure, New York Heart Association class II.  Status post thoracic aortic dissection repair in 1996.  Dyslipidemia.  Peripheral neuropathy  Seizure disorder.  Renal insufficiency- Dr Prieto  History of right nephrectomy secondary to trauma  Pulmonary embolus/LLE DVT 20/Right lower extremity DVT  12/19/19  History of venous cerebral thrombus post spinal anesthesia 2004  History of Hypercoagulable state- hematology [facility]  Pulmonary nodule/mass  Possible intolerance to Niaspan-rash  Intolerance Entresto 97/103 twice daily due to symptomatic hypotension  Intolerance of Coreg 25 mg twice daily due to symptomatic bradycardia     RECOMMENDATIONS:  Patient appears to be stable from a cardiac standpoint. No symptoms of angina. No evidence of heart failure. No symptomatic arrhythmia. No symptomatic bradycardia. I encouraged patient increase his water intake to 64 ounces per day.  Advise exercise program 30 minutes/day.  Follow-up with myself in 1 year.  Obtain exercise nuclear stress test, CMP, lipid profile at that time.     Please excuse any errors in grammar or translation related to this dictation. Voice recognition software was utilized to prepare this document.        Recent Cardiovascular Testing:  The following results have been reviewed and updated.     24 Hour Holter 6/20/23  1. NSR 48-87, average 65 bpm  2. Frequent PVC's (10.7%)  3. Rare PAC's     Stress ECHO: 3/11/08  1. Negative.  2. EF 50%.     Echo 6/8/21  1.Mild TR  2.EF 50-55 %     Labs: 5/10/25  , TG 87, HDL 36,      Zio Patch 7/14/2021.  1.NSVT x 4 beats at 113-126 bmp  2.NSR with intermittent second degree AV block  3.Sinus Bradycardia 43 bpm        MRA chest and thoracic aorta: 3/25/09  1. No evidence of dissection.     Vascular CTA: 10/11/13  1. Intact surgical repair of proximal ascending aorta.     Rt lower ext duplex: 12/9/19  1. Occlusive thrombosis of the right common femoral, femoral, popiteal, and posterior tibial veins.     Cath: 12/2/19  1. Ostial LAD: 50%  2. Mid LAD: 50%.  3. Circumflex patent stents.  4. OM1 Cx: 100%.  5. Proximal RCA: 100%  6. SVG to the 2nd Marginal: 100%  7. SVG to the PDA: 100%.  8. SVG to the 3rd Marginal; 100%.  9. EF 45%.     MPL: 6/20/23  1. EF 51%  2. No ischemia.  3. Severe  inferior/inferolateral myocardial infarction by perfusion imaging       VITALS  Vitals:    07/02/25 1055   BP: 108/68   Pulse: 62     Wt Readings from Last 4 Encounters:   07/02/25 83.3 kg (183 lb 9.6 oz)   07/01/24 86.3 kg (190 lb 4.8 oz)   06/28/23 90.3 kg (199 lb)   08/03/22 93.2 kg (205 lb 6.4 oz)       PHYSICAL EXAM:  GENERAL:  Well developed, well nourished, in no acute distress.  CHEST:  Symmetric and nontender.  NEURO/PSYCH:  Alert and oriented times three with approppriate behavior and responses.  NECK:  Supple, no JVD, no bruit.  LUNGS:  Clear to auscultation bilaterally, normal respiratory effort.  HEART:  Rate and rhythm regular with no evident murmur, no gallop appreciated.                   There are no rubs, clicks or heaves.  EXTREMITIES:  Warm with good color, no clubbing or cyanosis.  There is no edema noted.  PERIPHERAL VASCULAR:  Pulses present and equally palpable; 2+ throughout.    ASSESSMENT AND PLAN  Diagnoses and all orders for this visit:  Coronary artery disease involving coronary bypass graft of native heart without angina pectoris  -     Nuclear Stress Test; Future  -     Comprehensive metabolic panel; Future  -     Follow Up In Cardiology; Future  Ischemic cardiomyopathy  -     Nuclear Stress Test; Future  -     Follow Up In Cardiology; Future  NSVT (nonsustained ventricular tachycardia) (Multi)  -     Follow Up In Cardiology; Future  Chronic systolic congestive heart failure  -     Nuclear Stress Test; Future  -     Follow Up In Cardiology; Future  S/P aortic dissection repair  -     Follow Up In Cardiology; Future  Dyslipidemia (high LDL; low HDL)  -     Lipid panel; Future  -     Follow Up In Cardiology; Future  Renal insufficiency  -     Follow Up In Cardiology; Future  Never smoked cigarettes  -     Follow Up In Cardiology; Future  BMI 26.0-26.9,adult  -     Follow Up In Cardiology; Future      Past Medical History  Medical History[1]    Social History  Social History[2]    Family  History   Family History[3]     Allergies:  RX Allergies[4]     Outpatient Medications:  Current Outpatient Medications   Medication Instructions    amoxicillin (Amoxil) 500 mg capsule TAKE 4 CAPSULES BY ORAL ROUTE ONE HOUR PRIOR TO DENTAL APPOINTMENT    carvedilol (COREG) 12.5 mg, 2 times daily    clonazePAM (KlonoPIN) 2 mg tablet TAKE 1 TABLET BY MOUTH EVERY DAY AT THE SAME TIME EACH DAY    diphenhydrAMINE-acetaminophen (Tylenol PM Extra Strength)  mg per tablet TAKE 2 TABLETs PO AT BEDTIME.    lovastatin (MEVACOR) 20 mg, Daily before breakfast    nitroglycerin (Nitrostat) 0.4 mg SL tablet PLACE 1 TABLET UNDER THE TONGUE EVERY 5 MINUTES UP TO 3 DOSES AS NEEDED FOR CHEST PAIN.    Repatha SureClick 140 mg/mL injection INJECT 1 ML UNDER THE SKIN EVERY 14 DAYS    Repatha SureClick 140 mg, subcutaneous, Every 14 days    sacubitriL-valsartan (Entresto) 24-26 mg tablet 1 tablet, oral, 2 times daily, 1 tab BID , If systolic BP is less than 100 HOLD medication    temazepam (Restoril) 30 mg capsule TAKE 1 CAPSULE BY MOUTH EVERY OTHER DAY AS NEEDED, ALTERNATING WITH TYLENOL PM    Xarelto 20 mg tablet TAKE 1 TABLET EYE EVERY DAY        Recent Lab Results:    CMP:    Lab Results   Component Value Date     07/09/2022    K 3.8 07/09/2022     (H) 07/09/2022    CO2 23 07/09/2022    BUN 18 07/09/2022    CREATININE 1.30 07/09/2022    GLUCOSE 87 07/09/2022    CALCIUM 8.2 (L) 07/09/2022       Magnesium:    Lab Results   Component Value Date    MG 2.20 07/08/2022       Lipid Profile:    Lab Results   Component Value Date    TRIG 110 06/03/2021    HDL 34.0 (A) 06/03/2021       TSH:    Lab Results   Component Value Date    TSH 1.64 07/08/2022       BNP:   Lab Results   Component Value Date     (H) 07/08/2022        PT/INR:    Lab Results   Component Value Date    PROTIME 17.6 (H) 07/08/2022    INR 1.5 (H) 07/08/2022       HgBA1c:    Lab Results   Component Value Date    HGBA1C 5.0 11/30/2019       BMP:  Lab  Results   Component Value Date     07/09/2022     07/08/2022     06/03/2021    K 3.8 07/09/2022    K 4.3 07/08/2022    K 4.6 06/03/2021     (H) 07/09/2022     (H) 07/08/2022     06/03/2021    CO2 23 07/09/2022    CO2 23 07/08/2022    CO2 25 06/03/2021    BUN 18 07/09/2022    BUN 29 (H) 07/08/2022    BUN 31 (H) 06/03/2021    CREATININE 1.30 07/09/2022    CREATININE 1.54 (H) 07/08/2022    CREATININE 1.51 (H) 06/03/2021       CBC:  Lab Results   Component Value Date    WBC 7.0 07/08/2022    WBC 7.5 11/30/2019    WBC 8.0 11/29/2019    RBC 5.44 07/08/2022    RBC 4.59 11/30/2019    RBC 4.94 11/29/2019    HGB 16.7 07/08/2022    HGB 14.6 11/30/2019    HGB 15.8 11/29/2019    HCT 49.9 07/08/2022    HCT 44.2 11/30/2019    HCT 46.7 11/29/2019    MCV 92 07/08/2022    MCV 96 11/30/2019    MCV 95 11/29/2019    MCHC 33.5 07/08/2022    MCHC 33.0 11/30/2019    MCHC 33.8 11/29/2019    RDW 12.7 07/08/2022    RDW 12.8 11/30/2019    RDW 12.8 11/29/2019     (L) 07/08/2022    PLT 90 (L) 11/30/2019    PLT 89 (L) 11/29/2019       Cardiac Enzymes:    Lab Results   Component Value Date    TROPHS 7 07/08/2022    TROPHS 7 07/08/2022    TROPHS 7 07/08/2022       Hepatic Function Panel:    Lab Results   Component Value Date    ALKPHOS 54 06/03/2021    ALT 15 06/03/2021    AST 18 06/03/2021    PROT 7.3 06/03/2021    BILITOT 0.6 06/03/2021         IArielle LPN am scribing for, and in the presence of Dr. Madhav Ramirez DO, [facility].    I, Dr. Madhav Ramirez DO, [facility], personally performed the services described in the documentation as scribed by Arielle Bourgeois LPN in my presence, and confirm it is both accurate and complete.      Dr. Madhav Ramirez DO   Thank you for allowing me to participate in the care of this patient. Please do not hesitate to contact me with any further questions or concerns.                 [1] History reviewed. No pertinent past medical history.  [2]   Social  History  Tobacco Use    Smoking status: Never    Smokeless tobacco: Never   Substance Use Topics    Alcohol use: Never    Drug use: Never   [3]   Family History  Problem Relation Name Age of Onset    Diabetes Mother      Other (CABG) Father  62   [4]   Allergies  Allergen Reactions    Atorvastatin Myalgia    Ezetimibe-Simvastatin Myalgia    Pravastatin Myalgia    Rosuvastatin Myalgia    Simvastatin Myalgia

## 2026-07-08 ENCOUNTER — APPOINTMENT (OUTPATIENT)
Dept: CARDIOLOGY | Facility: CLINIC | Age: 76
End: 2026-07-08
Payer: MEDICARE

## (undated) DEVICE — INTENDED FOR TISSUE SEPARATION, AND OTHER PROCEDURES THAT REQUIRE A SHARP SURGICAL BLADE TO PUNCTURE OR CUT.: Brand: BARD-PARKER ® CARBON RIB-BACK BLADES

## (undated) DEVICE — GAUZE,SPONGE,4"X4",12PLY,STERILE,LF,2'S: Brand: MEDLINE

## (undated) DEVICE — CATHETER IV 16GA L1 1/4IN PERIPH GRY S STL FEP PLAS HUB THN

## (undated) DEVICE — SUTURE VCRL + SZ 4-0 L27IN ABSRB UD PS-2 3/8 CIR REV CUT VCP426H

## (undated) DEVICE — CARBIDE MATCH HEAD

## (undated) DEVICE — 3M™ STERI-STRIP™ REINFORCED ADHESIVE SKIN CLOSURES, R1547, 1/2 IN X 4 IN (12 MM X 100 MM), 6 STRIPS/ENVELOPE: Brand: 3M™ STERI-STRIP™

## (undated) DEVICE — CHLORAPREP 26ML ORANGE

## (undated) DEVICE — SLEEVE CMPR SM STD CALF SCD ANEMB LF

## (undated) DEVICE — SYRINGE BLB 50CC IRRIG PLIABLE FNGR FLNG GRAD FLSK DISP

## (undated) DEVICE — SYRINGE MED 10ML LUERLOCK TIP W/O SFTY DISP

## (undated) DEVICE — SUTURE VCRL + SZ 4-0 L18IN ABSRB UD L19MM PS-2 3/8 CIR PRIM VCP496H

## (undated) DEVICE — SYRINGE MED 30ML STD CLR PLAS LUERLOCK TIP N CTRL DISP

## (undated) DEVICE — MEDI-VAC NON-CONDUCTIVE SUCTION TUBING: Brand: CARDINAL HEALTH

## (undated) DEVICE — NEEDLE HYPO 22GA L1 1/2IN PIVOTING SHLD FOR LUERLOCK SYR

## (undated) DEVICE — 1842 FOAM BLOCK NEEDLE COUNTER: Brand: DEVON

## (undated) DEVICE — 3M™ IOBAN™ 2 ANTIMICROBIAL INCISE DRAPE 6650EZ: Brand: IOBAN™ 2

## (undated) DEVICE — 3M™ STERI-DRAPE™ INSTRUMENT POUCH 1018: Brand: STERI-DRAPE™

## (undated) DEVICE — SUTURE VCRL + SZ 3-0 L18IN ABSRB UD PS-2 3/8 CIR REV CUT VCP497H

## (undated) DEVICE — CORD BPLR 2 PIN FLAT AND RND DISP

## (undated) DEVICE — SKIN MARKER,REGULAR TIP WITH RULER: Brand: DEVON

## (undated) DEVICE — LABEL MED MINI W/ MARKER

## (undated) DEVICE — PACK,LAPAROTOMY,NO GOWNS: Brand: MEDLINE

## (undated) DEVICE — CATHETER IV 16GA 205ML/MIN L1.77IN OD1.74MM ID1.359MM GRY

## (undated) DEVICE — X-RAY DETECTABLE SPONGES,16 PLY: Brand: VISTEC

## (undated) DEVICE — ELECTRODE PT RET AD L9FT HI MOIST COND ADH HYDRGEL CORDED

## (undated) DEVICE — GAUZE,SPONGE,2"X2",8PLY,STERILE,LF,2'S: Brand: MEDLINE

## (undated) DEVICE — GLOVE SURG 7 LTX TRIFLEX WIDE FINGER PWDR

## (undated) DEVICE — CODMAN® SURGICAL PATTIES 1/2" X 1/2" (1.27CM X 1.27CM): Brand: CODMAN®

## (undated) DEVICE — GOWN,AURORA,NONREINFORCED,LARGE: Brand: MEDLINE

## (undated) DEVICE — 2000CC GUARDIAN II: Brand: GUARDIAN

## (undated) DEVICE — VIAL ACCESS CANNULA,SMART TIP: Brand: MONOJECT

## (undated) DEVICE — SHEET,DRAPE,53X77,STERILE: Brand: MEDLINE

## (undated) DEVICE — 3M™ TEGADERM™ TRANSPARENT FILM DRESSING FRAME STYLE, 1624W, 2-3/8 IN X 2-3/4 IN (6 CM X 7 CM), 100/CT 4CT/CASE: Brand: 3M™ TEGADERM™

## (undated) DEVICE — TOWEL,OR,DSP,ST,BLUE,STD,4/PK,20PK/CS: Brand: MEDLINE

## (undated) DEVICE — SUTURE VCRL + SZ 2-0 L18IN ABSRB VLT OS-4 L22MM 1/2 CIR REV VCP706T

## (undated) DEVICE — PENCIL ES L3M BTTN SWCH HOLSTER W/ BLDE ELECTRD EDGE